# Patient Record
Sex: FEMALE | Race: WHITE | HISPANIC OR LATINO | Employment: FULL TIME
[De-identification: names, ages, dates, MRNs, and addresses within clinical notes are randomized per-mention and may not be internally consistent; named-entity substitution may affect disease eponyms.]

---

## 2017-01-10 ENCOUNTER — CHARTING TRANS (OUTPATIENT)
Dept: OTHER | Age: 32
End: 2017-01-10

## 2017-01-10 ENCOUNTER — LAB SERVICES (OUTPATIENT)
Dept: OTHER | Age: 32
End: 2017-01-10

## 2017-01-11 ENCOUNTER — CHARTING TRANS (OUTPATIENT)
Dept: OTHER | Age: 32
End: 2017-01-11

## 2017-01-11 LAB
C TRACH DNA SPEC QL NAA+PROBE: NEGATIVE
N GONORRHOEA DNA SPEC QL NAA+PROBE: NEGATIVE

## 2017-01-17 ENCOUNTER — CHARTING TRANS (OUTPATIENT)
Dept: OTHER | Age: 32
End: 2017-01-17

## 2017-01-18 ENCOUNTER — CHARTING TRANS (OUTPATIENT)
Dept: OTHER | Age: 32
End: 2017-01-18

## 2017-01-18 ENCOUNTER — CHARTING TRANS (OUTPATIENT)
Dept: PULMONOLOGY | Age: 32
End: 2017-01-18

## 2017-01-20 ENCOUNTER — CHARTING TRANS (OUTPATIENT)
Dept: OTHER | Age: 32
End: 2017-01-20

## 2017-02-07 ENCOUNTER — LAB SERVICES (OUTPATIENT)
Dept: OTHER | Age: 32
End: 2017-02-07

## 2017-02-07 ENCOUNTER — CHARTING TRANS (OUTPATIENT)
Dept: OTHER | Age: 32
End: 2017-02-07

## 2017-02-07 LAB
ALT SERPL W/O P-5'-P-CCNC: 26 U/L (ref 15–43)
AST SERPL-CCNC: 18 U/L (ref 14–43)
CK SERPL-CCNC: 23 U/L (ref 30–135)
PROT SERPL-MCNC: 5.7 G/DL (ref 6.4–8.5)
URATE SERPL-MCNC: 5.2 MG/DL (ref 2.5–6.2)

## 2017-02-08 LAB
DIFFERENTIAL TYPE: ABNORMAL
HEMATOCRIT: 29.4 % (ref 34–45)
HEMOGLOBIN: 9.5 G/DL (ref 11.2–15.7)
MEAN CORPUSCULAR HGB CONCENTRATION: 32.3 % (ref 32–36)
MEAN CORPUSCULAR HGB: 25.1 PG (ref 27–34)
MEAN CORPUSCULAR VOLUME: 77.6 FL (ref 79–95)
MEAN PLATELET VOLUME: 10.9 FL (ref 8.6–12.4)
PLATELET COUNT: 117 10*3/UL (ref 150–400)
RED BLOOD CELL COUNT: 3.79 10*6/UL (ref 3.7–5.2)
RED CELL DISTRIBUTION WIDTH: 15.5 % (ref 11.3–14.8)
WHITE BLOOD CELL COUNT: 3.7 10*3/UL (ref 4–10)

## 2017-02-14 ENCOUNTER — CHARTING TRANS (OUTPATIENT)
Dept: OTHER | Age: 32
End: 2017-02-14

## 2017-02-21 ENCOUNTER — CHARTING TRANS (OUTPATIENT)
Dept: OTHER | Age: 32
End: 2017-02-21

## 2017-02-22 ENCOUNTER — CHARTING TRANS (OUTPATIENT)
Dept: OTHER | Age: 32
End: 2017-02-22

## 2017-02-22 ENCOUNTER — MYAURORA ACCOUNT LINK (OUTPATIENT)
Dept: OTHER | Age: 32
End: 2017-02-22

## 2017-02-22 ENCOUNTER — CHARTING TRANS (OUTPATIENT)
Dept: URGENT CARE | Age: 32
End: 2017-02-22

## 2017-02-22 ASSESSMENT — PAIN SCALES - GENERAL: PAINLEVEL_OUTOF10: 0

## 2017-03-02 ENCOUNTER — CHARTING TRANS (OUTPATIENT)
Dept: OTHER | Age: 32
End: 2017-03-02

## 2017-03-07 ENCOUNTER — CHARTING TRANS (OUTPATIENT)
Dept: OTHER | Age: 32
End: 2017-03-07

## 2017-03-08 ENCOUNTER — CHARTING TRANS (OUTPATIENT)
Dept: OTHER | Age: 32
End: 2017-03-08

## 2017-03-09 ENCOUNTER — IMAGING SERVICES (OUTPATIENT)
Dept: OTHER | Age: 32
End: 2017-03-09

## 2017-03-09 ENCOUNTER — CHARTING TRANS (OUTPATIENT)
Dept: CARDIOLOGY | Age: 32
End: 2017-03-09

## 2017-03-09 ENCOUNTER — CHARTING TRANS (OUTPATIENT)
Dept: OTHER | Age: 32
End: 2017-03-09

## 2017-03-10 ENCOUNTER — CHARTING TRANS (OUTPATIENT)
Dept: OTHER | Age: 32
End: 2017-03-10

## 2017-03-15 ENCOUNTER — CHARTING TRANS (OUTPATIENT)
Dept: OTHER | Age: 32
End: 2017-03-15

## 2017-03-15 ENCOUNTER — IMAGING SERVICES (OUTPATIENT)
Dept: OTHER | Age: 32
End: 2017-03-15

## 2017-03-17 ENCOUNTER — CHARTING TRANS (OUTPATIENT)
Dept: OTHER | Age: 32
End: 2017-03-17

## 2017-03-17 ENCOUNTER — CHARTING TRANS (OUTPATIENT)
Dept: PULMONOLOGY | Age: 32
End: 2017-03-17

## 2017-03-20 ENCOUNTER — CHARTING TRANS (OUTPATIENT)
Dept: OTHER | Age: 32
End: 2017-03-20

## 2017-03-22 ENCOUNTER — LAB SERVICES (OUTPATIENT)
Dept: OTHER | Age: 32
End: 2017-03-22

## 2017-03-22 ENCOUNTER — CHARTING TRANS (OUTPATIENT)
Dept: OTHER | Age: 32
End: 2017-03-22

## 2017-03-22 ENCOUNTER — IMAGING SERVICES (OUTPATIENT)
Dept: OTHER | Age: 32
End: 2017-03-22

## 2017-03-22 LAB
BASOPHIL %: 0 % (ref 0–1.2)
BASOPHIL ABSOLUTE #: 0 10*3/UL (ref 0–0.1)
BUN SERPL-MCNC: 9 MG/DL (ref 7–20)
CALCIUM SERPL-MCNC: 9.1 MG/DL (ref 8.6–10.6)
CHLORIDE SERPL-SCNC: 100 MMOL/L (ref 96–107)
CREATININE, SERUM: 0.5 MG/DL (ref 0.5–1.4)
DIFFERENTIAL TYPE: ABNORMAL
EOSINOPHIL %: 1.2 % (ref 0–10)
EOSINOPHIL ABSOLUTE #: 0.1 10*3/UL (ref 0–0.5)
GFR SERPL CREATININE-BSD FRML MDRD: >60 ML/MIN/{1.73M2}
GFR SERPL CREATININE-BSD FRML MDRD: >60 ML/MIN/{1.73M2}
GLUCOSE SERPL-MCNC: 75 MG/DL (ref 70–200)
HCO3 SERPL-SCNC: 25 MMOL/L (ref 22–32)
HEMATOCRIT: 27.5 % (ref 34–45)
HEMOGLOBIN: 8.7 G/DL (ref 11.2–15.7)
LYMPH PERCENT: 18.1 % (ref 20.5–51.1)
LYMPHOCYTE ABSOLUTE #: 1.1 10*3/UL (ref 1.2–3.4)
MEAN CORPUSCULAR HGB CONCENTRATION: 31.6 % (ref 32–36)
MEAN CORPUSCULAR HGB: 25.4 PG (ref 27–34)
MEAN CORPUSCULAR VOLUME: 80.2 FL (ref 79–95)
MEAN PLATELET VOLUME: 11.1 FL (ref 8.6–12.4)
MONOCYTE ABSOLUTE #: 0.7 10*3/UL (ref 0.2–0.9)
MONOCYTE PERCENT: 11.7 % (ref 4.3–12.9)
NEUTROPHIL ABSOLUTE #: 4.1 10*3/UL (ref 1.4–6.5)
NEUTROPHIL PERCENT: 69 % (ref 34–73.5)
PLATELET COUNT: 148 10*3/UL (ref 150–400)
POTASSIUM SERPL-SCNC: 4.1 MMOL/L (ref 3.5–5.3)
RED BLOOD CELL COUNT: 3.43 10*6/UL (ref 3.7–5.2)
RED CELL DISTRIBUTION WIDTH: 16 % (ref 11.3–14.8)
SODIUM SERPL-SCNC: 138 MMOL/L (ref 136–146)
TSH SERPL DL<=0.05 MIU/L-ACNC: 2.35 M[IU]/L (ref 0.3–4.82)
WHITE BLOOD CELL COUNT: 5.9 10*3/UL (ref 4–10)

## 2017-03-23 ENCOUNTER — CHARTING TRANS (OUTPATIENT)
Dept: OTHER | Age: 32
End: 2017-03-23

## 2017-03-23 LAB
APTT INHIB SENS PPP: 22 SEC (ref 22–30)
APTT-LA IMM 1:2 NP PPP: 22.1 SEC (ref 22–30)
DRVVT IMM NP PPP: 39.1 SEC
LA 3 SCREEN W REFLEX-IMP: ABNORMAL
PATHOLOGIST NAME: ABNORMAL
PROT S ACT/NOR PPP: 70 % (ref 60–110)
SCREEN DRVVT: 40.9 SEC
THROMBIN TIME: 13.7 SEC (ref 15.3–21.1)

## 2017-03-24 LAB
# FETUSES US: ABNORMAL
AFP MOM: ABNORMAL MOM
AFP SERPL-MCNC: 85.5 NG/ML
AGE AT DELIVERY: 29.9 YEARS
B2 GLYCOPROT1 IGA SERPL IA-ACNC: <20 SAU
B2 GLYCOPROT1 IGG SERPL IA-ACNC: <20 SGU
B2 GLYCOPROT1 IGM SERPL IA-ACNC: <20 SMU
CARDIOLIPIN IGA SER IA-ACNC: <20 APL
CARDIOLIPIN IGG SER IA-ACNC: <20 GPL
CARDIOLIPIN IGM SER IA-ACNC: <20 MPL
GA: ABNORMAL WK
IDDM PATIENT QL: NO
NEURAL TUBE DEFECT RISK FETUS: ABNORMAL %
SCREENING STATUS: ABNORMAL
SERVICE CMNT-IMP: ABNORMAL
WEIGHT: ABNORMAL LB

## 2017-03-29 ENCOUNTER — CHARTING TRANS (OUTPATIENT)
Dept: OTHER | Age: 32
End: 2017-03-29

## 2017-04-04 ENCOUNTER — CHARTING TRANS (OUTPATIENT)
Dept: OTHER | Age: 32
End: 2017-04-04

## 2017-04-04 ENCOUNTER — LAB SERVICES (OUTPATIENT)
Dept: OTHER | Age: 32
End: 2017-04-04

## 2017-04-04 LAB
PROT 24H UR-MRATE: 105 MG/24 HR (ref 0–148)
PROT ?TM UR-MCNC: 14 MG/DL

## 2017-04-07 ENCOUNTER — CHARTING TRANS (OUTPATIENT)
Dept: OTHER | Age: 32
End: 2017-04-07

## 2017-04-20 ENCOUNTER — CHARTING TRANS (OUTPATIENT)
Dept: OTHER | Age: 32
End: 2017-04-20

## 2017-04-22 ENCOUNTER — LAB SERVICES (OUTPATIENT)
Dept: OTHER | Age: 32
End: 2017-04-22

## 2017-04-22 LAB
DIFFERENTIAL TYPE: ABNORMAL
GLUCOSE 1H P 50 G GLC PO SERPL-MCNC: 71 MG/DL (ref 70–130)
HEMATOCRIT: 26.8 % (ref 34–45)
HEMOGLOBIN: 8.5 G/DL (ref 11.2–15.7)
MEAN CORPUSCULAR HGB CONCENTRATION: 31.7 % (ref 32–36)
MEAN CORPUSCULAR HGB: 24.9 PG (ref 27–34)
MEAN CORPUSCULAR VOLUME: 78.6 FL (ref 79–95)
MEAN PLATELET VOLUME: 10.7 FL (ref 8.6–12.4)
PLATELET COUNT: 118 10*3/UL (ref 150–400)
RED BLOOD CELL COUNT: 3.41 10*6/UL (ref 3.7–5.2)
RED CELL DISTRIBUTION WIDTH: 14.6 % (ref 11.3–14.8)
WHITE BLOOD CELL COUNT: 3.8 10*3/UL (ref 4–10)

## 2017-04-25 ENCOUNTER — CHARTING TRANS (OUTPATIENT)
Dept: OTHER | Age: 32
End: 2017-04-25

## 2017-04-26 LAB — PLATELET AB SER QL: NORMAL

## 2017-05-01 ENCOUNTER — CHARTING TRANS (OUTPATIENT)
Dept: OTHER | Age: 32
End: 2017-05-01

## 2017-05-03 ENCOUNTER — CHARTING TRANS (OUTPATIENT)
Dept: OTHER | Age: 32
End: 2017-05-03

## 2017-05-09 ENCOUNTER — CHARTING TRANS (OUTPATIENT)
Dept: OTHER | Age: 32
End: 2017-05-09

## 2017-05-10 ENCOUNTER — LAB SERVICES (OUTPATIENT)
Dept: OTHER | Age: 32
End: 2017-05-10

## 2017-05-12 ENCOUNTER — MYAURORA ACCOUNT LINK (OUTPATIENT)
Dept: OTHER | Age: 32
End: 2017-05-12

## 2017-05-12 ENCOUNTER — CHARTING TRANS (OUTPATIENT)
Dept: PULMONOLOGY | Age: 32
End: 2017-05-12

## 2017-05-12 LAB — BILE AC SERPL-SCNC: 1 UMOL/L (ref 0–10)

## 2017-05-15 ENCOUNTER — CHARTING TRANS (OUTPATIENT)
Dept: OTHER | Age: 32
End: 2017-05-15

## 2017-05-16 ENCOUNTER — CHARTING TRANS (OUTPATIENT)
Dept: OTHER | Age: 32
End: 2017-05-16

## 2017-05-18 ENCOUNTER — CHARTING TRANS (OUTPATIENT)
Dept: OTHER | Age: 32
End: 2017-05-18

## 2017-05-19 ENCOUNTER — CHARTING TRANS (OUTPATIENT)
Dept: OTHER | Age: 32
End: 2017-05-19

## 2017-05-23 ENCOUNTER — CHARTING TRANS (OUTPATIENT)
Dept: OTHER | Age: 32
End: 2017-05-23

## 2017-05-23 ENCOUNTER — LAB SERVICES (OUTPATIENT)
Dept: OTHER | Age: 32
End: 2017-05-23

## 2017-05-23 LAB
FERRITIN SERPL-MCNC: 7 NG/ML (ref 6–137)
IRON SATN MFR SERPL: 5 % (ref 9–55)
IRON SERPL-MCNC: 31 UG/DL (ref 37–170)
TIBC SERPL-MCNC: 564 UG/DL (ref 250–450)

## 2017-05-24 ENCOUNTER — CHARTING TRANS (OUTPATIENT)
Dept: OTHER | Age: 32
End: 2017-05-24

## 2017-05-25 ENCOUNTER — CHARTING TRANS (OUTPATIENT)
Dept: OTHER | Age: 32
End: 2017-05-25

## 2017-06-07 ENCOUNTER — CHARTING TRANS (OUTPATIENT)
Dept: OTHER | Age: 32
End: 2017-06-07

## 2017-06-09 ENCOUNTER — LAB SERVICES (OUTPATIENT)
Dept: OTHER | Age: 32
End: 2017-06-09

## 2017-06-09 ENCOUNTER — IMAGING SERVICES (OUTPATIENT)
Dept: OTHER | Age: 32
End: 2017-06-09

## 2017-06-09 ENCOUNTER — CHARTING TRANS (OUTPATIENT)
Dept: OTHER | Age: 32
End: 2017-06-09

## 2017-06-09 LAB — FETAL FIBRONECTIN: NEGATIVE

## 2017-06-13 ENCOUNTER — LAB SERVICES (OUTPATIENT)
Dept: OTHER | Age: 32
End: 2017-06-13

## 2017-06-13 LAB
BASOPHIL %: 0 % (ref 0–1.2)
BASOPHIL ABSOLUTE #: 0 10*3/UL (ref 0–0.1)
DIFFERENTIAL TYPE: ABNORMAL
EOSINOPHIL %: 1.3 % (ref 0–10)
EOSINOPHIL ABSOLUTE #: 0.1 10*3/UL (ref 0–0.5)
HEMATOCRIT: 29.7 % (ref 34–45)
HEMOGLOBIN: 9.2 G/DL (ref 11.2–15.7)
LYMPH PERCENT: 17.3 % (ref 20.5–51.1)
LYMPHOCYTE ABSOLUTE #: 0.8 10*3/UL (ref 1.2–3.4)
MEAN CORPUSCULAR HGB CONCENTRATION: 31 % (ref 32–36)
MEAN CORPUSCULAR HGB: 23.7 PG (ref 27–34)
MEAN CORPUSCULAR VOLUME: 76.3 FL (ref 79–95)
MEAN PLATELET VOLUME: 11 FL (ref 8.6–12.4)
MONOCYTE ABSOLUTE #: 0.8 10*3/UL (ref 0.2–0.9)
MONOCYTE PERCENT: 16.6 % (ref 4.3–12.9)
NEUTROPHIL ABSOLUTE #: 3 10*3/UL (ref 1.4–6.5)
NEUTROPHIL PERCENT: 64.8 % (ref 34–73.5)
PLATELET COUNT: 136 10*3/UL (ref 150–400)
RED BLOOD CELL COUNT: 3.89 10*6/UL (ref 3.7–5.2)
RED CELL DISTRIBUTION WIDTH: 18.3 % (ref 11.3–14.8)
WHITE BLOOD CELL COUNT: 4.6 10*3/UL (ref 4–10)

## 2017-06-16 ENCOUNTER — CHARTING TRANS (OUTPATIENT)
Dept: OTHER | Age: 32
End: 2017-06-16

## 2017-06-16 ENCOUNTER — LAB SERVICES (OUTPATIENT)
Dept: OTHER | Age: 32
End: 2017-06-16

## 2017-06-17 LAB — FINAL REPORT: NORMAL

## 2017-06-19 ENCOUNTER — CHARTING TRANS (OUTPATIENT)
Dept: OTHER | Age: 32
End: 2017-06-19

## 2017-06-21 ENCOUNTER — CHARTING TRANS (OUTPATIENT)
Dept: OTHER | Age: 32
End: 2017-06-21

## 2017-06-23 ENCOUNTER — CHARTING TRANS (OUTPATIENT)
Dept: OBGYN | Age: 32
End: 2017-06-23

## 2017-06-23 ENCOUNTER — IMAGING SERVICES (OUTPATIENT)
Dept: OTHER | Age: 32
End: 2017-06-23

## 2017-06-29 ENCOUNTER — CHARTING TRANS (OUTPATIENT)
Dept: OTHER | Age: 32
End: 2017-06-29

## 2017-06-30 ENCOUNTER — IMAGING SERVICES (OUTPATIENT)
Dept: OTHER | Age: 32
End: 2017-06-30

## 2017-06-30 ENCOUNTER — LAB SERVICES (OUTPATIENT)
Dept: OTHER | Age: 32
End: 2017-06-30

## 2017-06-30 LAB
BASOPHIL %: 0.2 % (ref 0–1.2)
BASOPHIL ABSOLUTE #: 0 10*3/UL (ref 0–0.1)
DIFFERENTIAL TYPE: ABNORMAL
EOSINOPHIL %: 1.2 % (ref 0–10)
EOSINOPHIL ABSOLUTE #: 0.1 10*3/UL (ref 0–0.5)
HEMATOCRIT: 31.1 % (ref 34–45)
HEMOGLOBIN: 10.3 G/DL (ref 11.2–15.7)
LYMPH PERCENT: 18.6 % (ref 20.5–51.1)
LYMPHOCYTE ABSOLUTE #: 1.2 10*3/UL (ref 1.2–3.4)
MEAN CORPUSCULAR HGB CONCENTRATION: 33.1 % (ref 32–36)
MEAN CORPUSCULAR HGB: 25.1 PG (ref 27–34)
MEAN CORPUSCULAR VOLUME: 75.7 FL (ref 79–95)
MEAN PLATELET VOLUME: 11.1 FL (ref 8.6–12.4)
MONOCYTE ABSOLUTE #: 0.9 10*3/UL (ref 0.2–0.9)
MONOCYTE PERCENT: 14.6 % (ref 4.3–12.9)
NEUTROPHIL ABSOLUTE #: 4.2 10*3/UL (ref 1.4–6.5)
NEUTROPHIL PERCENT: 65.4 % (ref 34–73.5)
PLATELET COUNT: 142 10*3/UL (ref 150–400)
RED BLOOD CELL COUNT: 4.11 10*6/UL (ref 3.7–5.2)
RED CELL DISTRIBUTION WIDTH: 19.1 % (ref 11.3–14.8)
WHITE BLOOD CELL COUNT: 6.5 10*3/UL (ref 4–10)

## 2017-07-07 ENCOUNTER — CHARTING TRANS (OUTPATIENT)
Dept: OTHER | Age: 32
End: 2017-07-07

## 2017-07-07 ENCOUNTER — IMAGING SERVICES (OUTPATIENT)
Dept: OTHER | Age: 32
End: 2017-07-07

## 2017-07-10 ENCOUNTER — CHARTING TRANS (OUTPATIENT)
Dept: OTHER | Age: 32
End: 2017-07-10

## 2017-07-13 ENCOUNTER — LAB SERVICES (OUTPATIENT)
Dept: OTHER | Age: 32
End: 2017-07-13

## 2017-07-13 LAB
BASOPHIL MANUAL: 0 %
BASOPHILS: 0 X10
EOSINOPHILS: 0.1 X10
HEMATOCRIT: 33.4 %
HEMOGLOBIN: 11.2 G/DL
LYMPHOCYTES: 0.9 X10
MEAN CORPUSCULAR HGB: 25.4 PG
MEAN CORPUSCULAR HGB: 33.5 G/DL
MEAN CORPUSCULAR VOL: 75.8 FL
MEAN PLATELET VOLUME: 8.7 FL
MONOCYTE ABSOLUTE #: 0.9 X10
MONOCYTES: 16 %
NEUTROPHIL ABSOLUTE: 3.7 X10
PLATELET COUNT: 121 X10
RED BLOOD CELL COUNT: 4.4 X10
RED CELL DISTRIBUTIO: 21.2 %
WHITE BLOOD CELL COU: 5.6 X10

## 2017-07-14 ENCOUNTER — LAB SERVICES (OUTPATIENT)
Dept: OTHER | Age: 32
End: 2017-07-14

## 2017-07-14 ENCOUNTER — CHARTING TRANS (OUTPATIENT)
Dept: OTHER | Age: 32
End: 2017-07-14

## 2017-07-18 ENCOUNTER — CHARTING TRANS (OUTPATIENT)
Dept: OTHER | Age: 32
End: 2017-07-18

## 2017-07-18 LAB — TXT: NORMAL

## 2017-07-25 ENCOUNTER — CHARTING TRANS (OUTPATIENT)
Dept: OTHER | Age: 32
End: 2017-07-25

## 2017-08-01 ENCOUNTER — CHARTING TRANS (OUTPATIENT)
Dept: OTHER | Age: 32
End: 2017-08-01

## 2017-08-25 ENCOUNTER — LAB SERVICES (OUTPATIENT)
Dept: OTHER | Age: 32
End: 2017-08-25

## 2017-08-25 ENCOUNTER — CHARTING TRANS (OUTPATIENT)
Dept: OTHER | Age: 32
End: 2017-08-25

## 2017-08-25 LAB
DIFFERENTIAL TYPE: ABNORMAL
HEMATOCRIT: 34.8 % (ref 34–45)
HEMOGLOBIN: 11.3 G/DL (ref 11.2–15.7)
LYMPH PERCENT: 30.4 % (ref 20.5–51.1)
LYMPHOCYTE ABSOLUTE #: 1.1 10*3/UL (ref 1.2–3.4)
MEAN CORPUSCULAR HGB CONCENTRATION: 32.5 % (ref 32–36)
MEAN CORPUSCULAR HGB: 25.3 PG (ref 27–34)
MEAN CORPUSCULAR VOLUME: 78 FL (ref 79–95)
MEAN PLATELET VOLUME: 9.5 FL (ref 8.6–12.4)
MIXED %: 19.1 % (ref 4.3–12.9)
MIXED ABSOLUTE #: 0.7 10*3/UL (ref 0.2–0.9)
NEUTROPHIL ABSOLUTE #: 1.8 10*3/UL (ref 1.4–6.5)
NEUTROPHIL PERCENT: 50.5 % (ref 34–73.5)
PLATELET COUNT: 121 10*3/UL (ref 150–400)
RED BLOOD CELL COUNT: 4.46 10*6/UL (ref 3.7–5.2)
RED CELL DISTRIBUTION WIDTH: 13.7 % (ref 11.3–14.8)
WHITE BLOOD CELL COUNT: 3.6 10*3/UL (ref 4–10)

## 2017-08-30 ENCOUNTER — CHARTING TRANS (OUTPATIENT)
Dept: OTHER | Age: 32
End: 2017-08-30

## 2017-09-29 ENCOUNTER — CHARTING TRANS (OUTPATIENT)
Dept: HEMATOLOGY/ONCOLOGY | Age: 32
End: 2017-09-29

## 2017-09-29 ENCOUNTER — MYAURORA ACCOUNT LINK (OUTPATIENT)
Dept: OTHER | Age: 32
End: 2017-09-29

## 2017-09-29 ASSESSMENT — PAIN SCALES - GENERAL: PAINLEVEL_OUTOF10: 0

## 2017-10-10 ENCOUNTER — CHARTING TRANS (OUTPATIENT)
Dept: OTHER | Age: 32
End: 2017-10-10

## 2017-11-03 ENCOUNTER — LAB SERVICES (OUTPATIENT)
Dept: OTHER | Age: 32
End: 2017-11-03

## 2017-11-03 ENCOUNTER — IMAGING SERVICES (OUTPATIENT)
Dept: OTHER | Age: 32
End: 2017-11-03

## 2017-11-03 LAB
CREATININE, SERUM: 0.51 MG/DL (ref 0.4–1.4)
GFR AFRICAN AMERICAN: NORMAL ML/MIN/1.73SQ.M.
GFR NON AFRICAN AMER: >60 ML/MIN/1.73SQ.M.

## 2017-11-09 ENCOUNTER — CHARTING TRANS (OUTPATIENT)
Dept: OTHER | Age: 32
End: 2017-11-09

## 2018-01-25 ENCOUNTER — CHARTING TRANS (OUTPATIENT)
Dept: OTHER | Age: 33
End: 2018-01-25

## 2018-01-25 ENCOUNTER — MYAURORA ACCOUNT LINK (OUTPATIENT)
Dept: OTHER | Age: 33
End: 2018-01-25

## 2018-01-25 ASSESSMENT — PAIN SCALES - GENERAL: PAINLEVEL_OUTOF10: 4

## 2018-01-29 ENCOUNTER — CHARTING TRANS (OUTPATIENT)
Dept: OTHER | Age: 33
End: 2018-01-29

## 2018-02-05 ENCOUNTER — CHARTING TRANS (OUTPATIENT)
Dept: OTHER | Age: 33
End: 2018-02-05

## 2018-02-05 ENCOUNTER — IMAGING SERVICES (OUTPATIENT)
Dept: OTHER | Age: 33
End: 2018-02-05

## 2018-02-05 ENCOUNTER — MYAURORA ACCOUNT LINK (OUTPATIENT)
Dept: OTHER | Age: 33
End: 2018-02-05

## 2018-02-15 ENCOUNTER — CHARTING TRANS (OUTPATIENT)
Dept: OTHER | Age: 33
End: 2018-02-15

## 2018-02-15 ENCOUNTER — IMAGING SERVICES (OUTPATIENT)
Dept: OTHER | Age: 33
End: 2018-02-15

## 2018-02-19 ENCOUNTER — CHARTING TRANS (OUTPATIENT)
Dept: OTHER | Age: 33
End: 2018-02-19

## 2018-03-05 ENCOUNTER — CHARTING TRANS (OUTPATIENT)
Dept: OTHER | Age: 33
End: 2018-03-05

## 2018-03-05 ENCOUNTER — MYAURORA ACCOUNT LINK (OUTPATIENT)
Dept: OTHER | Age: 33
End: 2018-03-05

## 2018-03-06 ENCOUNTER — ANCILLARY ORDERS (OUTPATIENT)
Dept: OTHER | Age: 33
End: 2018-03-06

## 2018-03-06 DIAGNOSIS — E07.9 DISORDER OF THYROID: ICD-10-CM

## 2018-03-11 ENCOUNTER — MYAURORA ACCOUNT LINK (OUTPATIENT)
Dept: OTHER | Age: 33
End: 2018-03-11

## 2018-03-11 ENCOUNTER — CHARTING TRANS (OUTPATIENT)
Dept: OTHER | Age: 33
End: 2018-03-11

## 2018-03-14 ENCOUNTER — LAB SERVICES (OUTPATIENT)
Dept: OTHER | Age: 33
End: 2018-03-14

## 2018-03-14 LAB
ALBUMIN SERPL BCG-MCNC: 4.5 G/DL (ref 3.6–5.1)
ALP SERPL-CCNC: 125 U/L (ref 45–105)
ALT SERPL W/O P-5'-P-CCNC: 8 U/L (ref 7–34)
AST SERPL-CCNC: 11 U/L (ref 9–37)
BILIRUB SERPL-MCNC: 0.3 MG/DL (ref 0–1)
BUN SERPL-MCNC: 8 MG/DL (ref 7–20)
CALCIUM SERPL-MCNC: 8.8 MG/DL (ref 8.6–10.6)
CHLORIDE SERPL-SCNC: 103 MMOL/L (ref 96–107)
CREATININE, SERUM: 0.6 MG/DL (ref 0.5–1.4)
DIFFERENTIAL TYPE: ABNORMAL
GFR SERPL CREATININE-BSD FRML MDRD: >60 ML/MIN/{1.73M2}
GFR SERPL CREATININE-BSD FRML MDRD: >60 ML/MIN/{1.73M2}
GLUCOSE SERPL-MCNC: 105 MG/DL (ref 70–200)
HCO3 SERPL-SCNC: 27 MMOL/L (ref 22–32)
HEMATOCRIT: 32.2 % (ref 34–45)
HEMOGLOBIN: 10.1 G/DL (ref 11.2–15.7)
LYMPH PERCENT: 22.4 % (ref 20.5–51.1)
LYMPHOCYTE ABSOLUTE #: 1.2 10*3/UL (ref 1.2–3.4)
MEAN CORPUSCULAR HGB CONCENTRATION: 31.4 % (ref 32–36)
MEAN CORPUSCULAR HGB: 22.8 PG (ref 27–34)
MEAN CORPUSCULAR VOLUME: 72.7 FL (ref 79–95)
MEAN PLATELET VOLUME: 9.5 FL (ref 8.6–12.4)
MIXED %: 10.7 % (ref 4.3–12.9)
MIXED ABSOLUTE #: 0.6 10*3/UL (ref 0.2–0.9)
NEUTROPHIL ABSOLUTE #: 3.6 10*3/UL (ref 1.4–6.5)
NEUTROPHIL PERCENT: 66.9 % (ref 34–73.5)
PLATELET COUNT: 188 10*3/UL (ref 150–400)
POTASSIUM SERPL-SCNC: 3.7 MMOL/L (ref 3.5–5.3)
PROT SERPL-MCNC: 7 G/DL (ref 6.2–8.1)
RED BLOOD CELL COUNT: 4.43 10*6/UL (ref 3.7–5.2)
RED CELL DISTRIBUTION WIDTH: 13.3 % (ref 11.3–14.8)
SODIUM SERPL-SCNC: 140 MMOL/L (ref 136–146)
WHITE BLOOD CELL COUNT: 5.4 10*3/UL (ref 4–10)

## 2018-03-15 LAB
25(OH)D3 SERPL-MCNC: <12.8 NG/ML (ref 30–100)
TSH SERPL DL<=0.05 MIU/L-ACNC: 1.43 M[IU]/L (ref 0.3–4.82)

## 2018-09-10 ENCOUNTER — CHARTING TRANS (OUTPATIENT)
Dept: OTHER | Age: 33
End: 2018-09-10

## 2018-09-10 ENCOUNTER — MYAURORA ACCOUNT LINK (OUTPATIENT)
Dept: OTHER | Age: 33
End: 2018-09-10

## 2018-09-10 ASSESSMENT — PAIN SCALES - GENERAL: PAINLEVEL_OUTOF10: 2

## 2018-11-17 ENCOUNTER — MYAURORA ACCOUNT LINK (OUTPATIENT)
Dept: OTHER | Age: 33
End: 2018-11-17

## 2018-11-17 ENCOUNTER — CHARTING TRANS (OUTPATIENT)
Dept: OTHER | Age: 33
End: 2018-11-17

## 2018-11-23 ENCOUNTER — IMAGING SERVICES (OUTPATIENT)
Dept: OTHER | Age: 33
End: 2018-11-23

## 2018-11-28 VITALS
SYSTOLIC BLOOD PRESSURE: 118 MMHG | DIASTOLIC BLOOD PRESSURE: 50 MMHG | WEIGHT: 135 LBS | DIASTOLIC BLOOD PRESSURE: 66 MMHG | DIASTOLIC BLOOD PRESSURE: 70 MMHG | WEIGHT: 140 LBS | SYSTOLIC BLOOD PRESSURE: 110 MMHG | SYSTOLIC BLOOD PRESSURE: 90 MMHG | WEIGHT: 138 LBS

## 2018-11-28 VITALS
WEIGHT: 135 LBS | BODY MASS INDEX: 22.47 KG/M2 | WEIGHT: 135 LBS | SYSTOLIC BLOOD PRESSURE: 102 MMHG | SYSTOLIC BLOOD PRESSURE: 118 MMHG | DIASTOLIC BLOOD PRESSURE: 60 MMHG | DIASTOLIC BLOOD PRESSURE: 58 MMHG

## 2018-11-28 VITALS
HEIGHT: 65 IN | OXYGEN SATURATION: 98 % | DIASTOLIC BLOOD PRESSURE: 70 MMHG | SYSTOLIC BLOOD PRESSURE: 112 MMHG | RESPIRATION RATE: 18 BRPM | WEIGHT: 125 LBS | TEMPERATURE: 96.6 F | BODY MASS INDEX: 20.83 KG/M2 | HEART RATE: 80 BPM

## 2018-11-28 VITALS
OXYGEN SATURATION: 98 % | HEART RATE: 124 BPM | DIASTOLIC BLOOD PRESSURE: 58 MMHG | TEMPERATURE: 101.7 F | RESPIRATION RATE: 26 BRPM | SYSTOLIC BLOOD PRESSURE: 110 MMHG

## 2018-11-28 VITALS — SYSTOLIC BLOOD PRESSURE: 100 MMHG | BODY MASS INDEX: 22.63 KG/M2 | WEIGHT: 136 LBS | DIASTOLIC BLOOD PRESSURE: 50 MMHG

## 2018-11-28 VITALS
WEIGHT: 121 LBS | HEIGHT: 65 IN | BODY MASS INDEX: 20.16 KG/M2 | DIASTOLIC BLOOD PRESSURE: 60 MMHG | SYSTOLIC BLOOD PRESSURE: 116 MMHG

## 2018-11-28 VITALS
SYSTOLIC BLOOD PRESSURE: 102 MMHG | WEIGHT: 135 LBS | HEIGHT: 65 IN | HEART RATE: 98 BPM | BODY MASS INDEX: 22.49 KG/M2 | DIASTOLIC BLOOD PRESSURE: 56 MMHG

## 2018-11-28 VITALS
SYSTOLIC BLOOD PRESSURE: 98 MMHG | WEIGHT: 128 LBS | HEIGHT: 65 IN | HEART RATE: 97 BPM | OXYGEN SATURATION: 99 % | BODY MASS INDEX: 21.33 KG/M2 | RESPIRATION RATE: 20 BRPM | DIASTOLIC BLOOD PRESSURE: 66 MMHG

## 2018-11-28 VITALS — SYSTOLIC BLOOD PRESSURE: 98 MMHG | DIASTOLIC BLOOD PRESSURE: 58 MMHG | WEIGHT: 134 LBS

## 2018-11-28 VITALS
HEIGHT: 65 IN | WEIGHT: 128 LBS | BODY MASS INDEX: 21.33 KG/M2 | DIASTOLIC BLOOD PRESSURE: 58 MMHG | HEART RATE: 115 BPM | TEMPERATURE: 100.9 F | SYSTOLIC BLOOD PRESSURE: 106 MMHG | OXYGEN SATURATION: 100 %

## 2018-11-28 VITALS — DIASTOLIC BLOOD PRESSURE: 60 MMHG | SYSTOLIC BLOOD PRESSURE: 112 MMHG | WEIGHT: 136 LBS

## 2018-11-29 VITALS
SYSTOLIC BLOOD PRESSURE: 98 MMHG | DIASTOLIC BLOOD PRESSURE: 60 MMHG | HEIGHT: 65 IN | BODY MASS INDEX: 20.49 KG/M2 | WEIGHT: 123 LBS | OXYGEN SATURATION: 97 % | HEART RATE: 65 BPM

## 2018-11-29 VITALS
SYSTOLIC BLOOD PRESSURE: 102 MMHG | DIASTOLIC BLOOD PRESSURE: 50 MMHG | WEIGHT: 126 LBS | BODY MASS INDEX: 20.97 KG/M2 | DIASTOLIC BLOOD PRESSURE: 52 MMHG | WEIGHT: 132 LBS | DIASTOLIC BLOOD PRESSURE: 50 MMHG | SYSTOLIC BLOOD PRESSURE: 110 MMHG | WEIGHT: 130 LBS | BODY MASS INDEX: 21.63 KG/M2 | SYSTOLIC BLOOD PRESSURE: 98 MMHG | BODY MASS INDEX: 21.97 KG/M2

## 2018-11-29 VITALS — SYSTOLIC BLOOD PRESSURE: 110 MMHG | DIASTOLIC BLOOD PRESSURE: 50 MMHG | WEIGHT: 123 LBS

## 2019-01-13 ENCOUNTER — WALK IN (OUTPATIENT)
Dept: URGENT CARE | Age: 34
End: 2019-01-13

## 2019-01-13 DIAGNOSIS — Z23 NEED FOR INFLUENZA VACCINATION: Primary | ICD-10-CM

## 2019-01-13 PROCEDURE — 90686 IIV4 VACC NO PRSV 0.5 ML IM: CPT | Performed by: NURSE PRACTITIONER

## 2019-01-13 PROCEDURE — 90471 IMMUNIZATION ADMIN: CPT | Performed by: NURSE PRACTITIONER

## 2019-03-05 VITALS
TEMPERATURE: 98.5 F | DIASTOLIC BLOOD PRESSURE: 62 MMHG | SYSTOLIC BLOOD PRESSURE: 106 MMHG | OXYGEN SATURATION: 100 % | HEART RATE: 99 BPM | RESPIRATION RATE: 16 BRPM

## 2019-03-05 VITALS
OXYGEN SATURATION: 99 % | HEART RATE: 86 BPM | RESPIRATION RATE: 16 BRPM | TEMPERATURE: 96.6 F | SYSTOLIC BLOOD PRESSURE: 100 MMHG | DIASTOLIC BLOOD PRESSURE: 56 MMHG

## 2019-03-06 VITALS
SYSTOLIC BLOOD PRESSURE: 120 MMHG | DIASTOLIC BLOOD PRESSURE: 60 MMHG | HEIGHT: 64 IN | BODY MASS INDEX: 20.14 KG/M2 | HEART RATE: 110 BPM | TEMPERATURE: 98.2 F | WEIGHT: 118 LBS

## 2019-03-06 VITALS
RESPIRATION RATE: 16 BRPM | SYSTOLIC BLOOD PRESSURE: 120 MMHG | BODY MASS INDEX: 20.16 KG/M2 | HEART RATE: 94 BPM | HEIGHT: 65 IN | OXYGEN SATURATION: 100 % | WEIGHT: 121 LBS | DIASTOLIC BLOOD PRESSURE: 54 MMHG

## 2019-03-06 VITALS
SYSTOLIC BLOOD PRESSURE: 112 MMHG | OXYGEN SATURATION: 97 % | HEART RATE: 90 BPM | DIASTOLIC BLOOD PRESSURE: 64 MMHG | RESPIRATION RATE: 16 BRPM | TEMPERATURE: 97.9 F

## 2019-03-06 VITALS
RESPIRATION RATE: 16 BRPM | SYSTOLIC BLOOD PRESSURE: 114 MMHG | TEMPERATURE: 96.4 F | DIASTOLIC BLOOD PRESSURE: 62 MMHG | OXYGEN SATURATION: 98 % | HEART RATE: 93 BPM

## 2019-08-30 ENCOUNTER — LAB SERVICES (OUTPATIENT)
Dept: LAB | Age: 34
End: 2019-08-30

## 2019-08-30 ENCOUNTER — OFFICE VISIT (OUTPATIENT)
Dept: FAMILY MEDICINE | Age: 34
End: 2019-08-30

## 2019-08-30 VITALS
DIASTOLIC BLOOD PRESSURE: 66 MMHG | HEIGHT: 65 IN | HEART RATE: 96 BPM | RESPIRATION RATE: 18 BRPM | TEMPERATURE: 96 F | WEIGHT: 108 LBS | BODY MASS INDEX: 17.99 KG/M2 | OXYGEN SATURATION: 99 % | SYSTOLIC BLOOD PRESSURE: 112 MMHG

## 2019-08-30 DIAGNOSIS — D61.818 PANCYTOPENIA (CMD): ICD-10-CM

## 2019-08-30 DIAGNOSIS — D64.9 ANEMIA, UNSPECIFIED TYPE: ICD-10-CM

## 2019-08-30 DIAGNOSIS — R09.81 SINUS CONGESTION: ICD-10-CM

## 2019-08-30 DIAGNOSIS — D86.9 SARCOIDOSIS: ICD-10-CM

## 2019-08-30 DIAGNOSIS — R16.1 SPLENOMEGALY: ICD-10-CM

## 2019-08-30 DIAGNOSIS — R53.83 OTHER FATIGUE: ICD-10-CM

## 2019-08-30 DIAGNOSIS — R63.4 UNINTENTIONAL WEIGHT LOSS: ICD-10-CM

## 2019-08-30 DIAGNOSIS — R94.2 RESTRICTIVE PATTERN PRESENT ON PULMONARY FUNCTION TESTING: Primary | ICD-10-CM

## 2019-08-30 DIAGNOSIS — E07.9 ASYMMETRICAL THYROID: ICD-10-CM

## 2019-08-30 DIAGNOSIS — Z01.419 WELL WOMAN EXAM WITH ROUTINE GYNECOLOGICAL EXAM: ICD-10-CM

## 2019-08-30 DIAGNOSIS — D68.59 PROTEIN S DEFICIENCY (CMD): ICD-10-CM

## 2019-08-30 DIAGNOSIS — Z12.4 CERVICAL CANCER SCREENING: ICD-10-CM

## 2019-08-30 DIAGNOSIS — R76.0 LUPUS ANTICOAGULANT POSITIVE: ICD-10-CM

## 2019-08-30 DIAGNOSIS — Z23 NEED FOR PNEUMOCOCCAL VACCINATION: ICD-10-CM

## 2019-08-30 PROBLEM — J45.991 COUGH VARIANT ASTHMA: Status: ACTIVE | Noted: 2017-03-17

## 2019-08-30 PROBLEM — H61.21 IMPACTED CERUMEN OF RIGHT EAR: Status: ACTIVE | Noted: 2018-03-05

## 2019-08-30 PROBLEM — R01.1 HEART MURMUR: Status: ACTIVE | Noted: 2018-02-05

## 2019-08-30 LAB
25(OH)D3 SERPL-MCNC: 15.8 NG/ML (ref 30–100)
ALBUMIN SERPL-MCNC: 4.5 G/DL (ref 3.6–5.1)
ALP SERPL-CCNC: 89 U/L (ref 45–130)
ALT SERPL W/O P-5'-P-CCNC: 18 U/L (ref 4–38)
ANISOCYTOSIS: NORMAL
AST SERPL-CCNC: 30 U/L (ref 14–43)
BASOPHIL %: 0.3 % (ref 0–1.2)
BASOPHIL ABSOLUTE #: 0 10*3/UL (ref 0–0.1)
BILIRUB SERPL-MCNC: 0.6 MG/DL (ref 0–1.3)
BUN SERPL-MCNC: 10 MG/DL (ref 7–20)
CALCIUM SERPL-MCNC: 9.8 MG/DL (ref 8.6–10.6)
CHLORIDE SERPL-SCNC: 103 MMOL/L (ref 96–107)
CO2 SERPL-SCNC: 29 MMOL/L (ref 22–32)
CREAT SERPL-MCNC: 0.7 MG/DL (ref 0.5–1.4)
DIFFERENTIAL TYPE: ABNORMAL
EOSINOPHIL %: 4 % (ref 0–10)
EOSINOPHIL ABSOLUTE #: 0.1 10*3/UL (ref 0–0.5)
FERRITIN SERPL-MCNC: 10 NG/ML (ref 6–137)
GFR SERPL CREATININE-BSD FRML MDRD: >60 ML/MIN/{1.73M2}
GFR SERPL CREATININE-BSD FRML MDRD: >60 ML/MIN/{1.73M2}
GLUCOSE SERPL-MCNC: 85 MG/DL (ref 70–200)
HEMATOCRIT: 35.8 % (ref 34–45)
HEMOGLOBIN: 10.9 G/DL (ref 11.2–15.7)
HYPOCHROMASIA: NORMAL
IRON SATN MFR SERPL: 7 % (ref 9–55)
IRON SERPL-MCNC: 34 UG/DL (ref 37–170)
LYMPH PERCENT: 26.3 % (ref 20.5–51.1)
LYMPHOCYTE ABSOLUTE #: 0.9 10*3/UL (ref 1.2–3.4)
MEAN CORPUSCULAR HGB CONCENTRATION: 30.4 % (ref 32–36)
MEAN CORPUSCULAR HGB: 22.6 PG (ref 27–34)
MEAN CORPUSCULAR VOLUME: 74.3 FL (ref 79–95)
MEAN PLATELET VOLUME: 11.3 FL (ref 8.6–12.4)
MONOCYTE ABSOLUTE #: 0.6 10*3/UL (ref 0.2–0.9)
MONOCYTE PERCENT: 17.4 % (ref 4.3–12.9)
NEUTROPHIL ABSOLUTE #: 1.8 10*3/UL (ref 1.4–6.5)
NEUTROPHIL PERCENT: 52 % (ref 34–73.5)
PLATELET COUNT: 157 10*3/UL (ref 150–400)
POTASSIUM SERPL-SCNC: 4.5 MMOL/L (ref 3.5–5.3)
PROT SERPL-MCNC: 7.1 G/DL (ref 6.4–8.5)
RED BLOOD CELL COUNT: 4.82 10*6/UL (ref 3.7–5.2)
RED CELL DISTRIBUTION WIDTH: 16.3 % (ref 11.3–14.8)
SODIUM SERPL-SCNC: 139 MMOL/L (ref 136–146)
TIBC SERPL-MCNC: 523 UG/DL (ref 250–450)
TSH SERPL DL<=0.05 MIU/L-ACNC: 2.87 M[IU]/L (ref 0.3–4.82)
WHITE BLOOD CELL COUNT: 3.5 10*3/UL (ref 4–10)

## 2019-08-30 PROCEDURE — 83540 ASSAY OF IRON: CPT | Performed by: FAMILY MEDICINE

## 2019-08-30 PROCEDURE — 82728 ASSAY OF FERRITIN: CPT | Performed by: FAMILY MEDICINE

## 2019-08-30 PROCEDURE — 80053 COMPREHEN METABOLIC PANEL: CPT | Performed by: FAMILY MEDICINE

## 2019-08-30 PROCEDURE — 84443 ASSAY THYROID STIM HORMONE: CPT | Performed by: FAMILY MEDICINE

## 2019-08-30 PROCEDURE — 85025 COMPLETE CBC W/AUTO DIFF WBC: CPT | Performed by: FAMILY MEDICINE

## 2019-08-30 PROCEDURE — 36415 COLL VENOUS BLD VENIPUNCTURE: CPT | Performed by: FAMILY MEDICINE

## 2019-08-30 PROCEDURE — 90732 PPSV23 VACC 2 YRS+ SUBQ/IM: CPT

## 2019-08-30 PROCEDURE — 82306 VITAMIN D 25 HYDROXY: CPT | Performed by: FAMILY MEDICINE

## 2019-08-30 PROCEDURE — 90471 IMMUNIZATION ADMIN: CPT

## 2019-08-30 PROCEDURE — 99395 PREV VISIT EST AGE 18-39: CPT | Performed by: FAMILY MEDICINE

## 2019-08-30 PROCEDURE — 88142 CYTOPATH C/V THIN LAYER: CPT | Performed by: PATHOLOGY

## 2019-08-30 PROCEDURE — 99214 OFFICE O/P EST MOD 30 MIN: CPT | Performed by: FAMILY MEDICINE

## 2019-08-30 PROCEDURE — 83550 IRON BINDING TEST: CPT | Performed by: FAMILY MEDICINE

## 2019-08-30 RX ORDER — ALBUTEROL SULFATE 90 UG/1
2 AEROSOL, METERED RESPIRATORY (INHALATION)
COMMUNITY
Start: 2018-03-06 | End: 2019-12-30 | Stop reason: ALTCHOICE

## 2019-08-30 RX ORDER — FLUTICASONE PROPIONATE 50 MCG
2 SPRAY, SUSPENSION (ML) NASAL DAILY
Qty: 3 BOTTLE | Refills: 3 | Status: SHIPPED | OUTPATIENT
Start: 2019-08-30

## 2019-08-30 RX ORDER — FLUTICASONE PROPIONATE 50 MCG
2 SPRAY, SUSPENSION (ML) NASAL
COMMUNITY
Start: 2018-03-06 | End: 2019-12-30 | Stop reason: SDUPTHER

## 2019-08-30 RX ORDER — ERGOCALCIFEROL (VITAMIN D2) 50 MCG
2000 CAPSULE ORAL DAILY
Qty: 30 CAPSULE | Refills: 12 | Status: SHIPPED
Start: 2019-11-30 | End: 2021-03-15 | Stop reason: ALTCHOICE

## 2019-08-30 RX ORDER — ERGOCALCIFEROL 1.25 MG/1
50000 CAPSULE ORAL
Qty: 12 CAPSULE | Refills: 0 | Status: SHIPPED | OUTPATIENT
Start: 2019-09-02 | End: 2019-12-30 | Stop reason: SDUPTHER

## 2019-08-30 SDOH — SOCIAL STABILITY: SOCIAL INSECURITY
WITHIN THE LAST YEAR, HAVE TO BEEN RAPED OR FORCED TO HAVE ANY KIND OF SEXUAL ACTIVITY BY YOUR PARTNER OR EX-PARTNER?: PATIENT DECLINED

## 2019-08-30 SDOH — ECONOMIC STABILITY: FOOD INSECURITY: WITHIN THE PAST 12 MONTHS, YOU WORRIED THAT YOUR FOOD WOULD RUN OUT BEFORE YOU GOT MONEY TO BUY MORE.: PATIENT DECLINED

## 2019-08-30 SDOH — SOCIAL STABILITY: SOCIAL NETWORK: ARE YOU MARRIED, WIDOWED, DIVORCED, SEPARATED, NEVER MARRIED, OR LIVING WITH A PARTNER?: PATIENT DECLINED

## 2019-08-30 SDOH — ECONOMIC STABILITY: FOOD INSECURITY: WITHIN THE PAST 12 MONTHS, THE FOOD YOU BOUGHT JUST DIDN'T LAST AND YOU DIDN'T HAVE MONEY TO GET MORE.: PATIENT DECLINED

## 2019-08-30 SDOH — SOCIAL STABILITY: SOCIAL INSECURITY
WITHIN THE LAST YEAR, HAVE YOU BEEN HUMILIATED OR EMOTIONALLY ABUSED IN OTHER WAYS BY YOUR PARTNER OR EX-PARTNER?: PATIENT DECLINED

## 2019-08-30 SDOH — SOCIAL STABILITY: SOCIAL NETWORK: HOW OFTEN DO YOU ATTENT MEETINGS OF THE CLUB OR ORGANIZATION YOU BELONG TO?: PATIENT DECLINED

## 2019-08-30 SDOH — ECONOMIC STABILITY: TRANSPORTATION INSECURITY
IN THE PAST 12 MONTHS, HAS THE LACK OF TRANSPORTATION KEPT YOU FROM MEDICAL APPOINTMENTS OR FROM GETTING MEDICATIONS?: PATIENT DECLINED

## 2019-08-30 SDOH — HEALTH STABILITY: PHYSICAL HEALTH
ON AVERAGE, HOW MANY DAYS PER WEEK DO YOU ENGAGE IN MODERATE TO STRENUOUS EXERCISE (LIKE A BRISK WALK)?: PATIENT DECLINED

## 2019-08-30 SDOH — ECONOMIC STABILITY: TRANSPORTATION INSECURITY
IN THE PAST 12 MONTHS, HAS LACK OF TRANSPORTATION KEPT YOU FROM MEETINGS, WORK, OR FROM GETTING THINGS NEEDED FOR DAILY LIVING?: PATIENT DECLINED

## 2019-08-30 SDOH — SOCIAL STABILITY: SOCIAL NETWORK: IN A TYPICAL WEEK, HOW MANY TIMES DO YOU TALK ON THE PHONE WITH FAMILY, FRIENDS, OR NEIGHBORS?: PATIENT DECLINED

## 2019-08-30 SDOH — SOCIAL STABILITY: SOCIAL INSECURITY
WITHIN THE LAST YEAR, HAVE YOU BEEN KICKED, HIT, SLAPPED, OR OTHERWISE PHYSICALLY HURT BY YOUR PARTNER OR EX-PARTNER?: PATIENT DECLINED

## 2019-08-30 SDOH — SOCIAL STABILITY: SOCIAL INSECURITY: WITHIN THE LAST YEAR, HAVE YOU BEEN AFRAID OF YOUR PARTNER OR EX-PARTNER?: PATIENT DECLINED

## 2019-08-30 SDOH — SOCIAL STABILITY: SOCIAL NETWORK
DO YOU BELONG TO ANY CLUBS OR ORGANIZATIONS SUCH AS CHURCH GROUPS UNIONS, FRATERNAL OR ATHLETIC GROUPS, OR SCHOOL GROUPS?: PATIENT DECLINED

## 2019-08-30 SDOH — HEALTH STABILITY: MENTAL HEALTH
STRESS IS WHEN SOMEONE FEELS TENSE, NERVOUS, ANXIOUS, OR CAN'T SLEEP AT NIGHT BECAUSE THEIR MIND IS TROUBLED. HOW STRESSED ARE YOU?: TO SOME EXTENT

## 2019-08-30 SDOH — SOCIAL STABILITY: SOCIAL NETWORK: HOW OFTEN DO YOU GET TOGETHER WITH FRIENDS OR RELATIVES?: PATIENT DECLINED

## 2019-08-30 SDOH — ECONOMIC STABILITY: INCOME INSECURITY: HOW HARD IS IT FOR YOU TO PAY FOR THE VERY BASICS LIKE FOOD, HOUSING, MEDICAL CARE, AND HEATING?: NOT HARD AT ALL

## 2019-08-30 SDOH — HEALTH STABILITY: PHYSICAL HEALTH: ON AVERAGE, HOW MANY MINUTES DO YOU ENGAGE IN EXERCISE AT THIS LEVEL?: PATIENT DECLINED

## 2019-08-30 SDOH — SOCIAL STABILITY: SOCIAL NETWORK: HOW OFTEN DO YOU ATTEND CHURCH OR RELIGIOUS SERVICES?: PATIENT DECLINED

## 2019-08-30 ASSESSMENT — PATIENT HEALTH QUESTIONNAIRE - PHQ9
1. LITTLE INTEREST OR PLEASURE IN DOING THINGS: NOT AT ALL
SUM OF ALL RESPONSES TO PHQ9 QUESTIONS 1 AND 2: 0
SUM OF ALL RESPONSES TO PHQ9 QUESTIONS 1 AND 2: 0
2. FEELING DOWN, DEPRESSED OR HOPELESS: NOT AT ALL

## 2019-08-30 ASSESSMENT — ENCOUNTER SYMPTOMS
CHEST TIGHTNESS: 0
RHINORRHEA: 1
SLEEP DISTURBANCE: 1
FATIGUE: 1
SHORTNESS OF BREATH: 1

## 2019-09-06 LAB — AP REPORT: NORMAL

## 2019-09-10 LAB — HPV I/H RISK 4 DNA CVX QL NAA+PROBE: NORMAL

## 2019-11-09 ENCOUNTER — WALK IN (OUTPATIENT)
Dept: URGENT CARE | Age: 34
End: 2019-11-09

## 2019-11-09 VITALS
DIASTOLIC BLOOD PRESSURE: 60 MMHG | TEMPERATURE: 98.2 F | HEART RATE: 100 BPM | RESPIRATION RATE: 20 BRPM | SYSTOLIC BLOOD PRESSURE: 110 MMHG | OXYGEN SATURATION: 98 %

## 2019-11-09 DIAGNOSIS — J02.9 SORE THROAT: Primary | ICD-10-CM

## 2019-11-09 DIAGNOSIS — J06.9 URI, ACUTE: ICD-10-CM

## 2019-11-09 LAB
INTERNAL PROCEDURAL CONTROLS ACCEPTABLE: YES
S PYO AG THROAT QL IA.RAPID: NEGATIVE

## 2019-11-09 PROCEDURE — 99214 OFFICE O/P EST MOD 30 MIN: CPT | Performed by: PEDIATRICS

## 2019-11-09 PROCEDURE — 87880 STREP A ASSAY W/OPTIC: CPT | Performed by: PEDIATRICS

## 2019-11-09 RX ORDER — BENZONATATE 200 MG/1
200 CAPSULE ORAL 3 TIMES DAILY PRN
Qty: 20 CAPSULE | Refills: 0 | Status: SHIPPED | OUTPATIENT
Start: 2019-11-09 | End: 2019-12-30 | Stop reason: ALTCHOICE

## 2019-12-27 ENCOUNTER — TELEPHONE (OUTPATIENT)
Dept: PULMONOLOGY | Age: 34
End: 2019-12-27

## 2019-12-30 ENCOUNTER — WALK IN (OUTPATIENT)
Dept: URGENT CARE | Age: 34
End: 2019-12-30

## 2019-12-30 VITALS
HEART RATE: 90 BPM | RESPIRATION RATE: 16 BRPM | TEMPERATURE: 100.3 F | DIASTOLIC BLOOD PRESSURE: 68 MMHG | SYSTOLIC BLOOD PRESSURE: 112 MMHG | OXYGEN SATURATION: 99 %

## 2019-12-30 DIAGNOSIS — J45.901 ASTHMA WITH ACUTE EXACERBATION, UNSPECIFIED ASTHMA SEVERITY, UNSPECIFIED WHETHER PERSISTENT: ICD-10-CM

## 2019-12-30 DIAGNOSIS — J06.9 URI, ACUTE: Primary | ICD-10-CM

## 2019-12-30 DIAGNOSIS — H61.23 IMPACTED CERUMEN OF BOTH EARS: ICD-10-CM

## 2019-12-30 PROCEDURE — 69209 REMOVE IMPACTED EAR WAX UNI: CPT | Performed by: FAMILY MEDICINE

## 2019-12-30 PROCEDURE — 99214 OFFICE O/P EST MOD 30 MIN: CPT | Performed by: FAMILY MEDICINE

## 2019-12-30 RX ORDER — CODEINE PHOSPHATE AND GUAIFENESIN 10; 100 MG/5ML; MG/5ML
5 SOLUTION ORAL 4 TIMES DAILY PRN
Qty: 180 ML | Refills: 0 | Status: SHIPPED | OUTPATIENT
Start: 2019-12-30 | End: 2020-01-09

## 2019-12-30 RX ORDER — ALBUTEROL SULFATE 90 UG/1
AEROSOL, METERED RESPIRATORY (INHALATION)
Qty: 1 INHALER | Refills: 0 | Status: SHIPPED | OUTPATIENT
Start: 2019-12-30 | End: 2021-03-15 | Stop reason: SDUPTHER

## 2019-12-30 RX ORDER — PREDNISONE 20 MG/1
40 TABLET ORAL DAILY
Qty: 10 TABLET | Refills: 0 | Status: SHIPPED | OUTPATIENT
Start: 2019-12-30 | End: 2020-01-04

## 2020-01-03 ENCOUNTER — TELEPHONE (OUTPATIENT)
Dept: HEMATOLOGY/ONCOLOGY | Age: 35
End: 2020-01-03

## 2020-01-03 DIAGNOSIS — D61.818 PANCYTOPENIA (CMD): Primary | ICD-10-CM

## 2020-01-06 ENCOUNTER — OFFICE VISIT (OUTPATIENT)
Dept: PULMONOLOGY | Age: 35
End: 2020-01-06

## 2020-01-06 ENCOUNTER — IMAGING SERVICES (OUTPATIENT)
Dept: GENERAL RADIOLOGY | Age: 35
End: 2020-01-06
Attending: INTERNAL MEDICINE

## 2020-01-06 VITALS
SYSTOLIC BLOOD PRESSURE: 100 MMHG | OXYGEN SATURATION: 100 % | HEART RATE: 96 BPM | BODY MASS INDEX: 16.43 KG/M2 | TEMPERATURE: 98.9 F | HEIGHT: 65 IN | WEIGHT: 98.6 LBS | DIASTOLIC BLOOD PRESSURE: 60 MMHG

## 2020-01-06 DIAGNOSIS — J45.40 MODERATE PERSISTENT ASTHMA WITHOUT COMPLICATION: ICD-10-CM

## 2020-01-06 DIAGNOSIS — R05.9 COUGH: ICD-10-CM

## 2020-01-06 DIAGNOSIS — D86.9 SARCOIDOSIS: ICD-10-CM

## 2020-01-06 DIAGNOSIS — J45.991 COUGH VARIANT ASTHMA: Primary | ICD-10-CM

## 2020-01-06 DIAGNOSIS — R05.9 COUGH: Primary | ICD-10-CM

## 2020-01-06 PROCEDURE — 71046 X-RAY EXAM CHEST 2 VIEWS: CPT | Performed by: RADIOLOGY

## 2020-01-06 PROCEDURE — 99214 OFFICE O/P EST MOD 30 MIN: CPT | Performed by: INTERNAL MEDICINE

## 2020-01-06 RX ORDER — PREDNISONE 10 MG/1
TABLET ORAL
Qty: 30 TABLET | Refills: 0 | Status: SHIPPED | OUTPATIENT
Start: 2020-01-06 | End: 2020-04-08 | Stop reason: ALTCHOICE

## 2020-01-06 RX ORDER — LEVOFLOXACIN 500 MG/1
500 TABLET, FILM COATED ORAL DAILY
Qty: 10 TABLET | Refills: 0 | Status: SHIPPED | OUTPATIENT
Start: 2020-01-06 | End: 2020-01-16

## 2020-01-06 RX ORDER — FLUTICASONE PROPIONATE 220 UG/1
2 AEROSOL, METERED RESPIRATORY (INHALATION) 2 TIMES DAILY
Qty: 12 G | Refills: 6 | Status: SHIPPED | OUTPATIENT
Start: 2020-01-06 | End: 2021-03-15 | Stop reason: ALTCHOICE

## 2020-01-10 ENCOUNTER — TELEPHONE (OUTPATIENT)
Dept: PULMONOLOGY | Age: 35
End: 2020-01-10

## 2020-01-13 ENCOUNTER — E-ADVICE (OUTPATIENT)
Dept: PULMONOLOGY | Age: 35
End: 2020-01-13

## 2020-01-13 DIAGNOSIS — J45.991 COUGH VARIANT ASTHMA: Primary | ICD-10-CM

## 2020-01-14 ENCOUNTER — LAB SERVICES (OUTPATIENT)
Dept: LAB | Age: 35
End: 2020-01-14

## 2020-01-14 DIAGNOSIS — J45.991 COUGH VARIANT ASTHMA: ICD-10-CM

## 2020-01-14 DIAGNOSIS — D86.9 SARCOIDOSIS: ICD-10-CM

## 2020-01-14 LAB
BASOPHIL %: 0.1 % (ref 0–1.2)
BASOPHIL ABSOLUTE #: 0 10*3/UL (ref 0–0.1)
DIFFERENTIAL TYPE: ABNORMAL
EOSINOPHIL %: 1.2 % (ref 0–10)
EOSINOPHIL ABSOLUTE #: 0.1 10*3/UL (ref 0–0.5)
HEMATOCRIT: 32.6 % (ref 34–45)
HEMOGLOBIN: 10.1 G/DL (ref 11.2–15.7)
LYMPH PERCENT: 13.9 % (ref 20.5–51.1)
LYMPHOCYTE ABSOLUTE #: 1 10*3/UL (ref 1.2–3.4)
MEAN CORPUSCULAR HGB CONCENTRATION: 31 % (ref 32–36)
MEAN CORPUSCULAR HGB: 22.3 PG (ref 27–34)
MEAN CORPUSCULAR VOLUME: 72 FL (ref 79–95)
MEAN PLATELET VOLUME: 11.5 FL (ref 8.6–12.4)
MONOCYTE ABSOLUTE #: 0.9 10*3/UL (ref 0.2–0.9)
MONOCYTE PERCENT: 11.6 % (ref 4.3–12.9)
NEUTROPHIL ABSOLUTE #: 5.5 10*3/UL (ref 1.4–6.5)
NEUTROPHIL PERCENT: 73.2 % (ref 34–73.5)
PLATELET COUNT: 221 10*3/UL (ref 150–400)
RED BLOOD CELL COUNT: 4.53 10*6/UL (ref 3.7–5.2)
RED CELL DISTRIBUTION WIDTH: 17.2 % (ref 11.3–14.8)
SEDIMENTATION RATE, RBC: 3 MM/H (ref 0–20)
WHITE BLOOD CELL COUNT: 7.5 10*3/UL (ref 4–10)

## 2020-01-14 PROCEDURE — 82164 ANGIOTENSIN I ENZYME TEST: CPT | Performed by: INTERNAL MEDICINE

## 2020-01-14 PROCEDURE — 85025 COMPLETE CBC W/AUTO DIFF WBC: CPT | Performed by: INTERNAL MEDICINE

## 2020-01-14 PROCEDURE — 85652 RBC SED RATE AUTOMATED: CPT | Performed by: INTERNAL MEDICINE

## 2020-01-14 PROCEDURE — 36415 COLL VENOUS BLD VENIPUNCTURE: CPT | Performed by: INTERNAL MEDICINE

## 2020-01-17 LAB — ACE SERPL-CCNC: 32 U/L (ref 9–67)

## 2020-01-20 ENCOUNTER — APPOINTMENT (OUTPATIENT)
Dept: PULMONOLOGY | Age: 35
End: 2020-01-20

## 2020-01-29 ENCOUNTER — OFFICE VISIT (OUTPATIENT)
Dept: PULMONOLOGY | Age: 35
End: 2020-01-29

## 2020-01-29 ENCOUNTER — IMAGING SERVICES (OUTPATIENT)
Dept: GENERAL RADIOLOGY | Age: 35
End: 2020-01-29
Attending: INTERNAL MEDICINE

## 2020-01-29 VITALS
OXYGEN SATURATION: 100 % | BODY MASS INDEX: 17.66 KG/M2 | DIASTOLIC BLOOD PRESSURE: 62 MMHG | HEART RATE: 74 BPM | SYSTOLIC BLOOD PRESSURE: 104 MMHG | WEIGHT: 106 LBS | HEIGHT: 65 IN

## 2020-01-29 DIAGNOSIS — J18.9 PNEUMONIA, UNSPECIFIED ORGANISM: ICD-10-CM

## 2020-01-29 DIAGNOSIS — R53.83 OTHER FATIGUE: ICD-10-CM

## 2020-01-29 DIAGNOSIS — J45.991 COUGH VARIANT ASTHMA: Primary | ICD-10-CM

## 2020-01-29 DIAGNOSIS — J45.991 COUGH VARIANT ASTHMA: ICD-10-CM

## 2020-01-29 PROCEDURE — 99214 OFFICE O/P EST MOD 30 MIN: CPT | Performed by: INTERNAL MEDICINE

## 2020-01-29 PROCEDURE — 71046 X-RAY EXAM CHEST 2 VIEWS: CPT | Performed by: RADIOLOGY

## 2020-01-29 RX ORDER — ERGOCALCIFEROL 1.25 MG/1
CAPSULE ORAL
Qty: 12 CAPSULE | Refills: 0 | OUTPATIENT
Start: 2020-01-29

## 2020-02-07 ENCOUNTER — TELEPHONE (OUTPATIENT)
Dept: PULMONOLOGY | Age: 35
End: 2020-02-07

## 2020-02-07 RX ORDER — PREDNISONE 20 MG/1
40 TABLET ORAL DAILY
Qty: 10 TABLET | Refills: 0 | Status: SHIPPED | OUTPATIENT
Start: 2020-02-07 | End: 2020-02-12

## 2020-02-13 ENCOUNTER — TELEPHONE (OUTPATIENT)
Dept: PEDIATRICS | Age: 35
End: 2020-02-13

## 2020-02-27 ENCOUNTER — LAB SERVICES (OUTPATIENT)
Dept: LAB | Age: 35
End: 2020-02-27

## 2020-02-27 ENCOUNTER — OFFICE VISIT (OUTPATIENT)
Dept: OBGYN | Age: 35
End: 2020-02-27

## 2020-02-27 VITALS
TEMPERATURE: 98.1 F | HEIGHT: 64 IN | HEART RATE: 91 BPM | SYSTOLIC BLOOD PRESSURE: 108 MMHG | BODY MASS INDEX: 18.61 KG/M2 | DIASTOLIC BLOOD PRESSURE: 60 MMHG | WEIGHT: 109 LBS

## 2020-02-27 DIAGNOSIS — Z11.8 SCREENING FOR CHLAMYDIAL DISEASE: ICD-10-CM

## 2020-02-27 DIAGNOSIS — R53.83 OTHER FATIGUE: ICD-10-CM

## 2020-02-27 DIAGNOSIS — N91.2 AMENORRHEA: ICD-10-CM

## 2020-02-27 DIAGNOSIS — N91.2 AMENORRHEA: Primary | ICD-10-CM

## 2020-02-27 DIAGNOSIS — Z23 NEED FOR IMMUNIZATION AGAINST INFLUENZA: ICD-10-CM

## 2020-02-27 DIAGNOSIS — Z11.3 SCREENING FOR GONORRHEA: ICD-10-CM

## 2020-02-27 LAB
25(OH)D3 SERPL-MCNC: 23.1 NG/ML (ref 30–100)
B-HCG SERPL-ACNC: ABNORMAL M[IU]/ML (ref 0–6)
BASOPHIL %: 0.2 % (ref 0–1.2)
BASOPHIL ABSOLUTE #: 0 10*3/UL (ref 0–0.1)
BILIRUBIN URINE: NEGATIVE
BLOOD URINE: NEGATIVE
CLARITY: NORMAL
COLOR: YELLOW
DIFFERENTIAL TYPE: ABNORMAL
EOSINOPHIL %: 1.6 % (ref 0–10)
EOSINOPHIL ABSOLUTE #: 0.1 10*3/UL (ref 0–0.5)
GLUCOSE QUALITATIVE U: NEGATIVE
HBV SURFACE AG SERPL QL IA: NEGATIVE
HEMATOCRIT: 33.7 % (ref 34–45)
HEMOGLOBIN: 11 G/DL (ref 11.2–15.7)
HEPATITIS C ANTIBODY: NEGATIVE
HIV1+2 AB SERPL QL IA: NEGATIVE
KETONES, URINE: NEGATIVE
LEUKOCYTE ESTERASE URINE: NEGATIVE
LYMPH PERCENT: 15.6 % (ref 20.5–51.1)
LYMPHOCYTE ABSOLUTE #: 0.8 10*3/UL (ref 1.2–3.4)
MEAN CORPUSCULAR HGB CONCENTRATION: 32.6 % (ref 32–36)
MEAN CORPUSCULAR HGB: 24.3 PG (ref 27–34)
MEAN CORPUSCULAR VOLUME: 74.4 FL (ref 79–95)
MEAN PLATELET VOLUME: 11.8 FL (ref 8.6–12.4)
MONOCYTE ABSOLUTE #: 0.8 10*3/UL (ref 0.2–0.9)
MONOCYTE PERCENT: 15.6 % (ref 4.3–12.9)
MUCOUS: NORMAL
NEUTROPHIL ABSOLUTE #: 3.3 10*3/UL (ref 1.4–6.5)
NEUTROPHIL PERCENT: 67 % (ref 34–73.5)
NITRITE URINE: NEGATIVE
PH URINE: 6 (ref 5–7)
PLATELET COUNT: 161 10*3/UL (ref 150–400)
RED BLOOD CELL COUNT: 4.53 10*6/UL (ref 3.7–5.2)
RED BLOOD CELLS URINE: NORMAL (ref 0–3)
RED CELL DISTRIBUTION WIDTH: 16.9 % (ref 11.3–14.8)
RUBV IGG SERPL QL IA: NORMAL [IU]/ML
SPECIFIC GRAVITY URINE: 1.02 (ref 1–1.03)
SQUAMOUS EPITHELIAL CELLS: NORMAL
URINE PROTEIN, QUAL (DIPSTICK): NEGATIVE
UROBILINOGEN URINE: <2
WHITE BLOOD CELL COUNT: 4.9 10*3/UL (ref 4–10)
WHITE BLOOD CELLS URINE: NORMAL (ref 0–5)

## 2020-02-27 PROCEDURE — 36415 COLL VENOUS BLD VENIPUNCTURE: CPT | Performed by: NURSE PRACTITIONER

## 2020-02-27 PROCEDURE — 86803 HEPATITIS C AB TEST: CPT | Performed by: NURSE PRACTITIONER

## 2020-02-27 PROCEDURE — 87491 CHLMYD TRACH DNA AMP PROBE: CPT | Performed by: NURSE PRACTITIONER

## 2020-02-27 PROCEDURE — 85025 COMPLETE CBC W/AUTO DIFF WBC: CPT | Performed by: NURSE PRACTITIONER

## 2020-02-27 PROCEDURE — 87661 TRICHOMONAS VAGINALIS AMPLIF: CPT | Performed by: NURSE PRACTITIONER

## 2020-02-27 PROCEDURE — 90686 IIV4 VACC NO PRSV 0.5 ML IM: CPT

## 2020-02-27 PROCEDURE — 87591 N.GONORRHOEAE DNA AMP PROB: CPT | Performed by: NURSE PRACTITIONER

## 2020-02-27 PROCEDURE — 86762 RUBELLA ANTIBODY: CPT | Performed by: NURSE PRACTITIONER

## 2020-02-27 PROCEDURE — 90471 IMMUNIZATION ADMIN: CPT

## 2020-02-27 PROCEDURE — 84702 CHORIONIC GONADOTROPIN TEST: CPT | Performed by: NURSE PRACTITIONER

## 2020-02-27 PROCEDURE — 82306 VITAMIN D 25 HYDROXY: CPT | Performed by: NURSE PRACTITIONER

## 2020-02-27 PROCEDURE — 86850 RBC ANTIBODY SCREEN: CPT | Performed by: NURSE PRACTITIONER

## 2020-02-27 PROCEDURE — 86592 SYPHILIS TEST NON-TREP QUAL: CPT | Performed by: NURSE PRACTITIONER

## 2020-02-27 PROCEDURE — 99214 OFFICE O/P EST MOD 30 MIN: CPT | Performed by: NURSE PRACTITIONER

## 2020-02-27 PROCEDURE — 86703 HIV-1/HIV-2 1 RESULT ANTBDY: CPT | Performed by: NURSE PRACTITIONER

## 2020-02-27 PROCEDURE — 81003 URINALYSIS AUTO W/O SCOPE: CPT | Performed by: NURSE PRACTITIONER

## 2020-02-27 PROCEDURE — 87340 HEPATITIS B SURFACE AG IA: CPT | Performed by: NURSE PRACTITIONER

## 2020-02-27 ASSESSMENT — PATIENT HEALTH QUESTIONNAIRE - PHQ9
SUM OF ALL RESPONSES TO PHQ9 QUESTIONS 1 AND 2: 0
2. FEELING DOWN, DEPRESSED OR HOPELESS: NOT AT ALL
1. LITTLE INTEREST OR PLEASURE IN DOING THINGS: NOT AT ALL
SUM OF ALL RESPONSES TO PHQ9 QUESTIONS 1 AND 2: 0

## 2020-02-28 ENCOUNTER — TELEPHONE (OUTPATIENT)
Dept: OBGYN | Age: 35
End: 2020-02-28

## 2020-02-28 LAB
BLD GP AB SCN SERPL QL: NEGATIVE
C TRACH DNA GENITAL QL NAA+PROBE: NEGATIVE
N GONORRHOEA DNA GENITAL QL NAA+PROBE: NEGATIVE
TRICHOMONAS VAGINALIS (TV PCR): NEGATIVE

## 2020-03-01 LAB — RPR SER QL: NORMAL

## 2020-03-04 ENCOUNTER — IMAGING SERVICES (OUTPATIENT)
Dept: ULTRASOUND IMAGING | Age: 35
End: 2020-03-04
Attending: NURSE PRACTITIONER

## 2020-03-04 ENCOUNTER — TELEPHONE (OUTPATIENT)
Dept: PULMONOLOGY | Age: 35
End: 2020-03-04

## 2020-03-04 ENCOUNTER — LAB SERVICES (OUTPATIENT)
Dept: LAB | Age: 35
End: 2020-03-04

## 2020-03-04 ENCOUNTER — FIRST OB VISIT (OUTPATIENT)
Dept: OBGYN | Age: 35
End: 2020-03-04

## 2020-03-04 ENCOUNTER — OFFICE VISIT (OUTPATIENT)
Dept: PULMONOLOGY | Age: 35
End: 2020-03-04

## 2020-03-04 VITALS
TEMPERATURE: 98.7 F | HEART RATE: 119 BPM | WEIGHT: 107 LBS | SYSTOLIC BLOOD PRESSURE: 100 MMHG | DIASTOLIC BLOOD PRESSURE: 60 MMHG | BODY MASS INDEX: 17.81 KG/M2 | OXYGEN SATURATION: 95 %

## 2020-03-04 VITALS
TEMPERATURE: 99.2 F | BODY MASS INDEX: 17.83 KG/M2 | SYSTOLIC BLOOD PRESSURE: 110 MMHG | WEIGHT: 107 LBS | DIASTOLIC BLOOD PRESSURE: 60 MMHG | HEIGHT: 65 IN | HEART RATE: 111 BPM

## 2020-03-04 DIAGNOSIS — O09.629 SUPERVISION OF HIGH-RISK PREGNANCY OF YOUNG MULTIGRAVIDA: ICD-10-CM

## 2020-03-04 DIAGNOSIS — R52 GENERALIZED BODY ACHES: Primary | ICD-10-CM

## 2020-03-04 DIAGNOSIS — N91.2 AMENORRHEA: Primary | ICD-10-CM

## 2020-03-04 DIAGNOSIS — J45.991 COUGH VARIANT ASTHMA: Primary | ICD-10-CM

## 2020-03-04 DIAGNOSIS — R52 GENERALIZED BODY ACHES: ICD-10-CM

## 2020-03-04 DIAGNOSIS — O30.041 DICHORIONIC DIAMNIOTIC TWIN PREGNANCY IN FIRST TRIMESTER: Primary | ICD-10-CM

## 2020-03-04 DIAGNOSIS — N91.2 AMENORRHEA: ICD-10-CM

## 2020-03-04 LAB
INFLUENZA TYPE A ANTIGEN: NEGATIVE
INFLUENZA TYPE B ANTIGEN: NEGATIVE

## 2020-03-04 PROCEDURE — 87804 INFLUENZA ASSAY W/OPTIC: CPT | Performed by: OBSTETRICS & GYNECOLOGY

## 2020-03-04 PROCEDURE — 0500F INITIAL PRENATAL CARE VISIT: CPT | Performed by: OBSTETRICS & GYNECOLOGY

## 2020-03-04 PROCEDURE — 76814 OB US NUCHAL MEAS ADD-ON: CPT | Performed by: RADIOLOGY

## 2020-03-04 PROCEDURE — 76813 OB US NUCHAL MEAS 1 GEST: CPT | Performed by: RADIOLOGY

## 2020-03-04 PROCEDURE — 99213 OFFICE O/P EST LOW 20 MIN: CPT | Performed by: INTERNAL MEDICINE

## 2020-03-04 RX ORDER — PREDNISONE 10 MG/1
TABLET ORAL
Qty: 30 TABLET | Refills: 0 | Status: SHIPPED | OUTPATIENT
Start: 2020-03-04 | End: 2020-04-08 | Stop reason: ALTCHOICE

## 2020-03-05 PROBLEM — Z98.891 HISTORY OF LOW TRANSVERSE CESAREAN SECTION: Status: ACTIVE | Noted: 2020-03-05

## 2020-03-05 PROBLEM — Z67.40 TYPE O BLOOD, RH POSITIVE: Status: ACTIVE | Noted: 2020-03-05

## 2020-03-05 PROBLEM — O09.299 HX OF PRE-ECLAMPSIA IN PRIOR PREGNANCY, CURRENTLY PREGNANT: Status: ACTIVE | Noted: 2020-03-05

## 2020-03-05 PROBLEM — O30.049 DICHORIONIC DIAMNIOTIC TWIN PREGNANCY, ANTEPARTUM: Status: ACTIVE | Noted: 2020-03-05

## 2020-03-05 PROBLEM — Z86.2 HISTORY OF ANEMIA: Status: ACTIVE | Noted: 2020-03-05

## 2020-03-09 ENCOUNTER — APPOINTMENT (OUTPATIENT)
Dept: OBGYN | Age: 35
End: 2020-03-09

## 2020-03-09 ENCOUNTER — TELEPHONE (OUTPATIENT)
Dept: PULMONOLOGY | Age: 35
End: 2020-03-09

## 2020-03-09 ENCOUNTER — APPOINTMENT (OUTPATIENT)
Dept: ULTRASOUND IMAGING | Age: 35
End: 2020-03-09
Attending: NURSE PRACTITIONER

## 2020-03-09 RX ORDER — PREDNISONE 20 MG/1
TABLET ORAL
Qty: 14 TABLET | Refills: 0 | Status: SHIPPED | OUTPATIENT
Start: 2020-03-09 | End: 2020-04-08 | Stop reason: ALTCHOICE

## 2020-03-09 RX ORDER — FLUTICASONE PROPIONATE AND SALMETEROL 500; 50 UG/1; UG/1
1 POWDER RESPIRATORY (INHALATION)
Qty: 1 EACH | Refills: 6 | Status: SHIPPED | OUTPATIENT
Start: 2020-03-09 | End: 2021-03-15 | Stop reason: SDUPTHER

## 2020-03-11 ENCOUNTER — WALK IN (OUTPATIENT)
Dept: URGENT CARE | Age: 35
End: 2020-03-11

## 2020-03-11 VITALS
HEART RATE: 116 BPM | TEMPERATURE: 99.1 F | DIASTOLIC BLOOD PRESSURE: 50 MMHG | RESPIRATION RATE: 28 BRPM | SYSTOLIC BLOOD PRESSURE: 104 MMHG | OXYGEN SATURATION: 96 %

## 2020-03-11 DIAGNOSIS — R06.02 SOB (SHORTNESS OF BREATH): ICD-10-CM

## 2020-03-11 DIAGNOSIS — J02.9 SORE THROAT: ICD-10-CM

## 2020-03-11 DIAGNOSIS — J45.901 ASTHMA WITH ACUTE EXACERBATION, UNSPECIFIED ASTHMA SEVERITY, UNSPECIFIED WHETHER PERSISTENT: Primary | ICD-10-CM

## 2020-03-11 LAB
INTERNAL PROCEDURAL CONTROLS ACCEPTABLE: YES
S PYO AG THROAT QL IA.RAPID: NEGATIVE

## 2020-03-11 PROCEDURE — 94640 AIRWAY INHALATION TREATMENT: CPT | Performed by: FAMILY MEDICINE

## 2020-03-11 PROCEDURE — 87880 STREP A ASSAY W/OPTIC: CPT | Performed by: FAMILY MEDICINE

## 2020-03-11 PROCEDURE — 99214 OFFICE O/P EST MOD 30 MIN: CPT | Performed by: FAMILY MEDICINE

## 2020-03-11 RX ORDER — ALBUTEROL SULFATE 2.5 MG/3ML
SOLUTION RESPIRATORY (INHALATION)
Qty: 375 ML | Refills: 0 | Status: SHIPPED | OUTPATIENT
Start: 2020-03-11 | End: 2020-06-12

## 2020-03-11 RX ADMIN — Medication 2.5 MG: at 07:40

## 2020-03-12 ENCOUNTER — E-ADVICE (OUTPATIENT)
Dept: OBGYN | Age: 35
End: 2020-03-12

## 2020-03-16 ENCOUNTER — TELEPHONE (OUTPATIENT)
Dept: OBGYN | Age: 35
End: 2020-03-16

## 2020-03-16 ENCOUNTER — TELEPHONE (OUTPATIENT)
Dept: PULMONOLOGY | Age: 35
End: 2020-03-16

## 2020-03-16 RX ORDER — AZITHROMYCIN 250 MG/1
TABLET, FILM COATED ORAL
Qty: 6 TABLET | Refills: 0 | Status: SHIPPED | OUTPATIENT
Start: 2020-03-16 | End: 2020-04-02 | Stop reason: ALTCHOICE

## 2020-03-17 ENCOUNTER — APPOINTMENT (OUTPATIENT)
Dept: FAMILY MEDICINE | Age: 35
End: 2020-03-17

## 2020-03-17 ENCOUNTER — TELEPHONE (OUTPATIENT)
Dept: SCHEDULING | Age: 35
End: 2020-03-17

## 2020-03-17 ENCOUNTER — TELEPHONE (OUTPATIENT)
Dept: FAMILY MEDICINE | Age: 35
End: 2020-03-17

## 2020-03-24 ENCOUNTER — E-ADVICE (OUTPATIENT)
Dept: PULMONOLOGY | Age: 35
End: 2020-03-24

## 2020-03-24 ENCOUNTER — IMAGING SERVICES (OUTPATIENT)
Dept: OTHER | Age: 35
End: 2020-03-24

## 2020-03-27 ENCOUNTER — TELEPHONE (OUTPATIENT)
Dept: PULMONOLOGY | Age: 35
End: 2020-03-27

## 2020-03-27 DIAGNOSIS — R06.02 SOB (SHORTNESS OF BREATH): ICD-10-CM

## 2020-03-27 DIAGNOSIS — R05.3 PERSISTENT COUGH: Primary | ICD-10-CM

## 2020-03-27 DIAGNOSIS — R06.09 DOE (DYSPNEA ON EXERTION): ICD-10-CM

## 2020-03-30 ENCOUNTER — LAB SERVICES (OUTPATIENT)
Dept: LAB | Age: 35
End: 2020-03-30

## 2020-03-30 DIAGNOSIS — R06.02 SOB (SHORTNESS OF BREATH): ICD-10-CM

## 2020-03-30 DIAGNOSIS — R05.3 PERSISTENT COUGH: ICD-10-CM

## 2020-03-30 DIAGNOSIS — R06.09 DOE (DYSPNEA ON EXERTION): ICD-10-CM

## 2020-03-30 LAB
ALBUMIN SERPL-MCNC: 3.9 G/DL (ref 3.6–5.1)
ALP SERPL-CCNC: 93 U/L (ref 45–130)
ALT SERPL W/O P-5'-P-CCNC: 11 U/L (ref 4–38)
ANISOCYTOSIS: NORMAL
AST SERPL-CCNC: 22 U/L (ref 14–43)
BASOPHIL %: 0 % (ref 0–1.2)
BASOPHIL ABSOLUTE #: 0 10*3/UL (ref 0–0.1)
BILIRUB SERPL-MCNC: 0.3 MG/DL (ref 0–1.3)
BUN SERPL-MCNC: 8 MG/DL (ref 7–20)
CALCIUM SERPL-MCNC: 9.4 MG/DL (ref 8.6–10.6)
CHLORIDE SERPL-SCNC: 100 MMOL/L (ref 96–107)
CO2 SERPL-SCNC: 26 MMOL/L (ref 22–32)
CREAT SERPL-MCNC: 0.5 MG/DL (ref 0.5–1.4)
DIFFERENTIAL TYPE: ABNORMAL
ELLIPTOCYTES: NORMAL
EOSINOPHIL %: 1.5 % (ref 0–10)
EOSINOPHIL ABSOLUTE #: 0.1 10*3/UL (ref 0–0.5)
GFR SERPL CREATININE-BSD FRML MDRD: >60 ML/MIN/{1.73M2}
GFR SERPL CREATININE-BSD FRML MDRD: >60 ML/MIN/{1.73M2}
GLUCOSE SERPL-MCNC: 89 MG/DL (ref 70–200)
HEMATOCRIT: 30.9 % (ref 34–45)
HEMOGLOBIN: 9.7 G/DL (ref 11.2–15.7)
HYPOCHROMASIA: NORMAL
LYMPH PERCENT: 21.6 % (ref 20.5–51.1)
LYMPHOCYTE ABSOLUTE #: 0.7 10*3/UL (ref 1.2–3.4)
MEAN CORPUSCULAR HGB CONCENTRATION: 31.4 % (ref 32–36)
MEAN CORPUSCULAR HGB: 24.3 PG (ref 27–34)
MEAN CORPUSCULAR VOLUME: 77.3 FL (ref 79–95)
MEAN PLATELET VOLUME: 11.8 FL (ref 8.6–12.4)
MONOCYTE ABSOLUTE #: 0.6 10*3/UL (ref 0.2–0.9)
MONOCYTE PERCENT: 18.5 % (ref 4.3–12.9)
NEUTROPHIL ABSOLUTE #: 1.9 10*3/UL (ref 1.4–6.5)
NEUTROPHIL PERCENT: 58.4 % (ref 34–73.5)
PLATELET COUNT: 161 10*3/UL (ref 150–400)
POTASSIUM SERPL-SCNC: 4.1 MMOL/L (ref 3.5–5.3)
PROT SERPL-MCNC: 6.5 G/DL (ref 6.4–8.5)
RED BLOOD CELL COUNT: 4 10*6/UL (ref 3.7–5.2)
RED CELL DISTRIBUTION WIDTH: 17.1 % (ref 11.3–14.8)
SODIUM SERPL-SCNC: 133 MMOL/L (ref 136–146)
WHITE BLOOD CELL COUNT: 3.3 10*3/UL (ref 4–10)

## 2020-03-30 PROCEDURE — 36415 COLL VENOUS BLD VENIPUNCTURE: CPT | Performed by: INTERNAL MEDICINE

## 2020-03-30 PROCEDURE — 80053 COMPREHEN METABOLIC PANEL: CPT | Performed by: INTERNAL MEDICINE

## 2020-03-30 PROCEDURE — 82164 ANGIOTENSIN I ENZYME TEST: CPT | Performed by: INTERNAL MEDICINE

## 2020-03-30 PROCEDURE — 85025 COMPLETE CBC W/AUTO DIFF WBC: CPT | Performed by: INTERNAL MEDICINE

## 2020-03-31 ENCOUNTER — TELEPHONE (OUTPATIENT)
Dept: OBGYN | Age: 35
End: 2020-03-31

## 2020-04-01 ENCOUNTER — TELEPHONE (OUTPATIENT)
Dept: PULMONOLOGY | Age: 35
End: 2020-04-01

## 2020-04-01 ENCOUNTER — IMAGING SERVICES (OUTPATIENT)
Dept: ULTRASOUND IMAGING | Age: 35
End: 2020-04-01
Attending: OBSTETRICS & GYNECOLOGY

## 2020-04-01 DIAGNOSIS — O30.041 DICHORIONIC DIAMNIOTIC TWIN PREGNANCY IN FIRST TRIMESTER: ICD-10-CM

## 2020-04-01 DIAGNOSIS — O30.041 DICHORIONIC DIAMNIOTIC TWIN PREGNANCY IN FIRST TRIMESTER: Primary | ICD-10-CM

## 2020-04-01 LAB — ACE SERPL-CCNC: 51 U/L (ref 9–67)

## 2020-04-01 PROCEDURE — 76814 OB US NUCHAL MEAS ADD-ON: CPT | Performed by: RADIOLOGY

## 2020-04-01 PROCEDURE — 76813 OB US NUCHAL MEAS 1 GEST: CPT | Performed by: RADIOLOGY

## 2020-04-02 ENCOUNTER — E-ADVICE (OUTPATIENT)
Dept: PULMONOLOGY | Age: 35
End: 2020-04-02

## 2020-04-02 ENCOUNTER — TELEPHONE (OUTPATIENT)
Dept: MATERNAL FETAL MEDICINE | Age: 35
End: 2020-04-02

## 2020-04-03 ENCOUNTER — E-VISIT (OUTPATIENT)
Dept: URGENT CARE | Age: 35
End: 2020-04-03

## 2020-04-03 ENCOUNTER — V-VISIT (OUTPATIENT)
Dept: PULMONOLOGY | Age: 35
End: 2020-04-03

## 2020-04-03 DIAGNOSIS — J45.991 COUGH VARIANT ASTHMA: Primary | ICD-10-CM

## 2020-04-03 DIAGNOSIS — R69 DIAGNOSIS DEFERRED: Primary | ICD-10-CM

## 2020-04-03 PROCEDURE — 99214 OFFICE O/P EST MOD 30 MIN: CPT | Performed by: INTERNAL MEDICINE

## 2020-04-03 RX ORDER — FLUTICASONE PROPIONATE AND SALMETEROL 500; 50 UG/1; UG/1
1 POWDER RESPIRATORY (INHALATION) 2 TIMES DAILY
Qty: 1 EACH | Refills: 11 | Status: SHIPPED | OUTPATIENT
Start: 2020-04-03 | End: 2020-09-01 | Stop reason: SDUPTHER

## 2020-04-03 ASSESSMENT — LIFESTYLE VARIABLES
EVER_SMOKED: I HAVE NEVER SMOKED
SMOKING_STATUS: NO

## 2020-04-08 ENCOUNTER — OB CHECK (OUTPATIENT)
Dept: OBGYN | Age: 35
End: 2020-04-08

## 2020-04-08 VITALS
TEMPERATURE: 99.1 F | DIASTOLIC BLOOD PRESSURE: 50 MMHG | HEIGHT: 65 IN | SYSTOLIC BLOOD PRESSURE: 100 MMHG | BODY MASS INDEX: 17.99 KG/M2 | WEIGHT: 108 LBS

## 2020-04-08 DIAGNOSIS — O09.629 SUPERVISION OF HIGH-RISK PREGNANCY OF YOUNG MULTIGRAVIDA: Primary | ICD-10-CM

## 2020-04-08 PROCEDURE — 0502F SUBSEQUENT PRENATAL CARE: CPT | Performed by: OBSTETRICS & GYNECOLOGY

## 2020-04-09 ENCOUNTER — E-ADVICE (OUTPATIENT)
Dept: OBGYN | Age: 35
End: 2020-04-09

## 2020-04-29 ENCOUNTER — E-ADVICE (OUTPATIENT)
Dept: OBGYN | Age: 35
End: 2020-04-29

## 2020-04-29 DIAGNOSIS — O99.019 ANEMIA AFFECTING PREGNANCY, ANTEPARTUM: Primary | ICD-10-CM

## 2020-05-02 ENCOUNTER — LAB SERVICES (OUTPATIENT)
Dept: LAB | Age: 35
End: 2020-05-02

## 2020-05-02 DIAGNOSIS — O99.019 ANEMIA AFFECTING PREGNANCY, ANTEPARTUM: ICD-10-CM

## 2020-05-02 LAB
ANISOCYTOSIS: NORMAL
BASOPHIL %: 0 % (ref 0–1.2)
BASOPHIL ABSOLUTE #: 0 10*3/UL (ref 0–0.1)
DIFFERENTIAL TYPE: ABNORMAL
EOSINOPHIL %: 1.7 % (ref 0–10)
EOSINOPHIL ABSOLUTE #: 0.1 10*3/UL (ref 0–0.5)
HEMATOCRIT: 30.5 % (ref 34–45)
HEMOGLOBIN: 9.7 G/DL (ref 11.2–15.7)
HYPOCHROMASIA: NORMAL
LYMPH PERCENT: 21 % (ref 20.5–51.1)
LYMPHOCYTE ABSOLUTE #: 0.7 10*3/UL (ref 1.2–3.4)
MEAN CORPUSCULAR HGB CONCENTRATION: 31.8 % (ref 32–36)
MEAN CORPUSCULAR HGB: 24.6 PG (ref 27–34)
MEAN CORPUSCULAR VOLUME: 77.4 FL (ref 79–95)
MEAN PLATELET VOLUME: 11.1 FL (ref 8.6–12.4)
MONOCYTE ABSOLUTE #: 0.6 10*3/UL (ref 0.2–0.9)
MONOCYTE PERCENT: 16.4 % (ref 4.3–12.9)
NEUTROPHIL ABSOLUTE #: 2.2 10*3/UL (ref 1.4–6.5)
NEUTROPHIL PERCENT: 60.9 % (ref 34–73.5)
PLATELET COUNT: 131 10*3/UL (ref 150–400)
RED BLOOD CELL COUNT: 3.94 10*6/UL (ref 3.7–5.2)
RED CELL DISTRIBUTION WIDTH: 16 % (ref 11.3–14.8)
WHITE BLOOD CELL COUNT: 3.5 10*3/UL (ref 4–10)

## 2020-05-02 PROCEDURE — 85025 COMPLETE CBC W/AUTO DIFF WBC: CPT | Performed by: OBSTETRICS & GYNECOLOGY

## 2020-05-02 PROCEDURE — 36415 COLL VENOUS BLD VENIPUNCTURE: CPT | Performed by: OBSTETRICS & GYNECOLOGY

## 2020-05-06 ENCOUNTER — CLINICAL ABSTRACT (OUTPATIENT)
Dept: MATERNAL FETAL MEDICINE | Age: 35
End: 2020-05-06

## 2020-05-06 ENCOUNTER — TELEPHONE (OUTPATIENT)
Dept: MATERNAL FETAL MEDICINE | Age: 35
End: 2020-05-06

## 2020-05-06 ENCOUNTER — OB CHECK (OUTPATIENT)
Dept: OBGYN | Age: 35
End: 2020-05-06

## 2020-05-06 VITALS
HEART RATE: 102 BPM | TEMPERATURE: 97.9 F | DIASTOLIC BLOOD PRESSURE: 50 MMHG | BODY MASS INDEX: 19.16 KG/M2 | SYSTOLIC BLOOD PRESSURE: 100 MMHG | WEIGHT: 115 LBS | HEIGHT: 65 IN

## 2020-05-06 DIAGNOSIS — O30.042 DICHORIONIC DIAMNIOTIC TWIN PREGNANCY IN SECOND TRIMESTER: ICD-10-CM

## 2020-05-06 DIAGNOSIS — O09.629 SUPERVISION OF HIGH-RISK PREGNANCY OF YOUNG MULTIGRAVIDA: Primary | ICD-10-CM

## 2020-05-06 PROCEDURE — 0502F SUBSEQUENT PRENATAL CARE: CPT | Performed by: OBSTETRICS & GYNECOLOGY

## 2020-05-07 ENCOUNTER — OFFICE VISIT (OUTPATIENT)
Dept: MATERNAL FETAL MEDICINE | Age: 35
End: 2020-05-07
Attending: OBSTETRICS & GYNECOLOGY

## 2020-05-07 ENCOUNTER — TELEPHONE (OUTPATIENT)
Dept: OBGYN | Age: 35
End: 2020-05-07

## 2020-05-07 ENCOUNTER — PRIOR ORIGINAL RECORDS (OUTPATIENT)
Dept: HEALTH INFORMATION MANAGEMENT | Facility: OTHER | Age: 35
End: 2020-05-07

## 2020-05-07 VITALS
HEIGHT: 65 IN | DIASTOLIC BLOOD PRESSURE: 58 MMHG | WEIGHT: 114 LBS | SYSTOLIC BLOOD PRESSURE: 102 MMHG | BODY MASS INDEX: 18.99 KG/M2

## 2020-05-07 DIAGNOSIS — O30.042 DICHORIONIC DIAMNIOTIC TWIN PREGNANCY IN SECOND TRIMESTER: Primary | ICD-10-CM

## 2020-05-07 DIAGNOSIS — J45.991 COUGH VARIANT ASTHMA: Primary | ICD-10-CM

## 2020-05-07 DIAGNOSIS — O09.299 HX OF PRE-ECLAMPSIA IN PRIOR PREGNANCY, CURRENTLY PREGNANT: Primary | ICD-10-CM

## 2020-05-07 PROCEDURE — 99243 OFF/OP CNSLTJ NEW/EST LOW 30: CPT | Performed by: OBSTETRICS & GYNECOLOGY

## 2020-05-07 PROCEDURE — 76817 TRANSVAGINAL US OBSTETRIC: CPT

## 2020-05-07 PROCEDURE — 76810 OB US >/= 14 WKS ADDL FETUS: CPT

## 2020-05-07 PROCEDURE — 76805 OB US >/= 14 WKS SNGL FETUS: CPT | Performed by: OBSTETRICS & GYNECOLOGY

## 2020-05-08 ENCOUNTER — TELEPHONE (OUTPATIENT)
Dept: OBGYN | Age: 35
End: 2020-05-08

## 2020-05-08 DIAGNOSIS — R01.1 HEART MURMUR: ICD-10-CM

## 2020-05-08 DIAGNOSIS — O30.042 DICHORIONIC DIAMNIOTIC TWIN PREGNANCY IN SECOND TRIMESTER: Primary | ICD-10-CM

## 2020-05-08 RX ORDER — ENOXAPARIN SODIUM 100 MG/ML
40 INJECTION SUBCUTANEOUS DAILY
Qty: 12 ML | Refills: 11 | Status: SHIPPED | OUTPATIENT
Start: 2020-05-08 | End: 2021-03-15 | Stop reason: ALTCHOICE

## 2020-05-20 ENCOUNTER — TELEPHONE (OUTPATIENT)
Dept: MATERNAL FETAL MEDICINE | Age: 35
End: 2020-05-20

## 2020-05-21 ENCOUNTER — OFFICE VISIT (OUTPATIENT)
Dept: MATERNAL FETAL MEDICINE | Age: 35
End: 2020-05-21
Attending: OBSTETRICS & GYNECOLOGY

## 2020-05-21 DIAGNOSIS — D68.59 PROTEIN S DEFICIENCY (CMD): ICD-10-CM

## 2020-05-21 DIAGNOSIS — Z98.891 HISTORY OF CESAREAN SECTION: ICD-10-CM

## 2020-05-21 DIAGNOSIS — O30.049 TWIN PREGNANCY, TWINS DICHORIONIC AND DIAMNIOTIC: Primary | ICD-10-CM

## 2020-05-21 DIAGNOSIS — Z87.59 HISTORY OF PRE-ECLAMPSIA: ICD-10-CM

## 2020-05-21 DIAGNOSIS — O30.042 DICHORIONIC DIAMNIOTIC TWIN PREGNANCY IN SECOND TRIMESTER: ICD-10-CM

## 2020-05-21 PROCEDURE — 76817 TRANSVAGINAL US OBSTETRIC: CPT | Performed by: OBSTETRICS & GYNECOLOGY

## 2020-05-22 ENCOUNTER — TELEPHONE (OUTPATIENT)
Dept: OBGYN | Age: 35
End: 2020-05-22

## 2020-05-22 DIAGNOSIS — O30.042 DICHORIONIC DIAMNIOTIC TWIN PREGNANCY IN SECOND TRIMESTER: Primary | ICD-10-CM

## 2020-05-22 DIAGNOSIS — Z98.891 HISTORY OF C-SECTION: ICD-10-CM

## 2020-05-22 DIAGNOSIS — O09.292 HISTORY OF PRE-ECLAMPSIA IN PRIOR PREGNANCY, CURRENTLY PREGNANT IN SECOND TRIMESTER: ICD-10-CM

## 2020-05-22 DIAGNOSIS — J45.909 MATERNAL ASTHMA: ICD-10-CM

## 2020-05-22 DIAGNOSIS — O99.519 MATERNAL ASTHMA: ICD-10-CM

## 2020-05-30 ENCOUNTER — LAB SERVICES (OUTPATIENT)
Dept: LAB | Age: 35
End: 2020-05-30

## 2020-05-30 DIAGNOSIS — Z83.2 FAMILY HISTORY OF LUPUS ANTICOAGULANT DISORDER: Primary | ICD-10-CM

## 2020-05-30 DIAGNOSIS — O99.019 ANEMIA AFFECTING PREGNANCY, ANTEPARTUM: ICD-10-CM

## 2020-05-30 LAB
ANISOCYTOSIS: ABNORMAL
BANDS: 1 % (ref 0–5)
DIFFERENTIAL TYPE: ABNORMAL
EOSINOPHIL % MD: 2 % (ref 0–10)
EOSINOPHIL ABSOLUTE # MD: 0.1 /UL (ref 0–0.5)
HEMATOCRIT: 29 % (ref 34–45)
HEMOGLOBIN: 9.3 G/DL (ref 11.2–15.7)
HYPOCHROMASIA: ABNORMAL
LYMPH PERCENT MD: 23 % (ref 20.5–51.1)
LYMPHOCYTE ABSOLUTE # MD: 0.7 /UL (ref 1.2–3.4)
MEAN CORPUSCULAR HGB CONCENTRATION: 32.1 % (ref 32–36)
MEAN CORPUSCULAR HGB: 25.1 PG (ref 27–34)
MEAN CORPUSCULAR VOLUME: 78.4 FL (ref 79–95)
MEAN PLATELET VOLUME: 11 FL (ref 8.6–12.4)
MONOCYTE ABSOLUTE # MD: 0.3 /UL (ref 0.2–0.9)
MONOCYTE PERCENT MD: 10 % (ref 4.3–12.9)
NEUTROPHIL ABSOLUTE # MD: 2 /UL (ref 1.4–6.5)
NEUTROPHIL PERCENT MD: 64 % (ref 34–73.5)
PLATELET COUNT: 138 10*3/UL (ref 150–400)
POLYCHROMASIA: ABNORMAL
RED BLOOD CELL COUNT: 3.7 10*6/UL (ref 3.7–5.2)
RED CELL DISTRIBUTION WIDTH: 15.2 % (ref 11.3–14.8)
WHITE BLOOD CELL COUNT: 3.1 10*3/UL (ref 4–10)

## 2020-05-30 PROCEDURE — 36415 COLL VENOUS BLD VENIPUNCTURE: CPT | Performed by: OBSTETRICS & GYNECOLOGY

## 2020-05-30 PROCEDURE — 86235 NUCLEAR ANTIGEN ANTIBODY: CPT | Performed by: OBSTETRICS & GYNECOLOGY

## 2020-05-30 PROCEDURE — 85025 COMPLETE CBC W/AUTO DIFF WBC: CPT | Performed by: OBSTETRICS & GYNECOLOGY

## 2020-06-01 DIAGNOSIS — Z86.2 HISTORY OF ANEMIA: Primary | ICD-10-CM

## 2020-06-01 LAB
ENA RNP IGG SER IA-ACNC: <0.3 E U/ML (ref 0–7)
LACV IGG SER IA-ACNC: <0.3 E U/ML (ref 0–7)

## 2020-06-02 ENCOUNTER — TELEPHONE (OUTPATIENT)
Dept: MATERNAL FETAL MEDICINE | Age: 35
End: 2020-06-02

## 2020-06-02 LAB — FAX RESULTS: NORMAL

## 2020-06-03 ENCOUNTER — OB CHECK (OUTPATIENT)
Dept: OBGYN | Age: 35
End: 2020-06-03

## 2020-06-03 VITALS
SYSTOLIC BLOOD PRESSURE: 90 MMHG | TEMPERATURE: 98.7 F | WEIGHT: 120 LBS | HEIGHT: 65 IN | BODY MASS INDEX: 19.99 KG/M2 | DIASTOLIC BLOOD PRESSURE: 60 MMHG | HEART RATE: 99 BPM

## 2020-06-03 DIAGNOSIS — O30.042 DICHORIONIC DIAMNIOTIC TWIN PREGNANCY IN SECOND TRIMESTER: ICD-10-CM

## 2020-06-03 DIAGNOSIS — O09.629 SUPERVISION OF HIGH-RISK PREGNANCY OF YOUNG MULTIGRAVIDA: Primary | ICD-10-CM

## 2020-06-03 PROCEDURE — 0502F SUBSEQUENT PRENATAL CARE: CPT | Performed by: OBSTETRICS & GYNECOLOGY

## 2020-06-04 ENCOUNTER — OFFICE VISIT (OUTPATIENT)
Dept: MATERNAL FETAL MEDICINE | Age: 35
End: 2020-06-04
Attending: OBSTETRICS & GYNECOLOGY

## 2020-06-04 ENCOUNTER — TELEPHONE (OUTPATIENT)
Dept: OBGYN | Age: 35
End: 2020-06-04

## 2020-06-04 DIAGNOSIS — D68.59 PROTEIN S DEFICIENCY (CMD): ICD-10-CM

## 2020-06-04 DIAGNOSIS — O30.042 DICHORIONIC DIAMNIOTIC TWIN PREGNANCY IN SECOND TRIMESTER: ICD-10-CM

## 2020-06-04 DIAGNOSIS — O30.049 DICHORIONIC DIAMNIOTIC TWIN PREGNANCY, ANTEPARTUM: Primary | ICD-10-CM

## 2020-06-04 DIAGNOSIS — D86.9 SARCOIDOSIS: ICD-10-CM

## 2020-06-04 DIAGNOSIS — O35.03X2 CHOROID PLEXUS CYST OF FETUS AFFECTING CARE OF MOTHER, ANTEPARTUM, FETUS 2 OF MULTIPLE GESTATION: Primary | ICD-10-CM

## 2020-06-04 DIAGNOSIS — E07.9 ASYMMETRICAL THYROID: ICD-10-CM

## 2020-06-04 DIAGNOSIS — O30.049 TWIN PREGNANCY, TWINS DICHORIONIC AND DIAMNIOTIC: ICD-10-CM

## 2020-06-04 DIAGNOSIS — O09.299 HX OF PRE-ECLAMPSIA IN PRIOR PREGNANCY, CURRENTLY PREGNANT: ICD-10-CM

## 2020-06-04 DIAGNOSIS — O40.2XX2 POLYHYDRAMNIOS IN SECOND TRIMESTER, FETUS 2 OF MULTIPLE GESTATION: ICD-10-CM

## 2020-06-04 DIAGNOSIS — R01.1 HEART MURMUR: ICD-10-CM

## 2020-06-04 DIAGNOSIS — Z98.891 HISTORY OF CESAREAN SECTION: ICD-10-CM

## 2020-06-04 DIAGNOSIS — J45.909 ASTHMA: ICD-10-CM

## 2020-06-04 DIAGNOSIS — R76.0 LUPUS ANTICOAGULANT POSITIVE: ICD-10-CM

## 2020-06-04 DIAGNOSIS — Z87.59 HISTORY OF PRE-ECLAMPSIA: ICD-10-CM

## 2020-06-04 DIAGNOSIS — J45.991 COUGH VARIANT ASTHMA: ICD-10-CM

## 2020-06-04 DIAGNOSIS — Z98.891 HISTORY OF LOW TRANSVERSE CESAREAN SECTION: ICD-10-CM

## 2020-06-04 PROCEDURE — 76811 OB US DETAILED SNGL FETUS: CPT | Performed by: OBSTETRICS & GYNECOLOGY

## 2020-06-04 PROCEDURE — 76812 OB US DETAILED ADDL FETUS: CPT | Performed by: OBSTETRICS & GYNECOLOGY

## 2020-06-04 PROCEDURE — 76817 TRANSVAGINAL US OBSTETRIC: CPT | Performed by: OBSTETRICS & GYNECOLOGY

## 2020-06-05 ENCOUNTER — LAB SERVICES (OUTPATIENT)
Dept: LAB | Age: 35
End: 2020-06-05

## 2020-06-05 DIAGNOSIS — O30.042 DICHORIONIC DIAMNIOTIC TWIN PREGNANCY IN SECOND TRIMESTER: ICD-10-CM

## 2020-06-05 DIAGNOSIS — Z83.2 FAMILY HISTORY OF DISEASES OF THE BLOOD AND BLOOD-FORMING ORGANS AND CERTAIN DISORDERS INVOLVING THE IMMUNE MECHANISM: ICD-10-CM

## 2020-06-05 DIAGNOSIS — O99.019 ANEMIA AFFECTING PREGNANCY, ANTEPARTUM: ICD-10-CM

## 2020-06-05 LAB — GLUCOSE 1H P 50 G GLC PO SERPL-MCNC: 103 MG/DL (ref 70–130)

## 2020-06-05 PROCEDURE — 82950 GLUCOSE TEST: CPT | Performed by: OBSTETRICS & GYNECOLOGY

## 2020-06-05 PROCEDURE — 36415 COLL VENOUS BLD VENIPUNCTURE: CPT | Performed by: OBSTETRICS & GYNECOLOGY

## 2020-06-12 RX ORDER — ALBUTEROL SULFATE 2.5 MG/3ML
SOLUTION RESPIRATORY (INHALATION)
Qty: 375 ML | Refills: 6 | Status: SHIPPED | OUTPATIENT
Start: 2020-06-12

## 2020-06-15 ENCOUNTER — LAB SERVICES (OUTPATIENT)
Dept: LAB | Age: 35
End: 2020-06-15

## 2020-06-15 ENCOUNTER — NURSE ONLY (OUTPATIENT)
Dept: OBGYN | Age: 35
End: 2020-06-15

## 2020-06-15 VITALS — WEIGHT: 123 LBS | BODY MASS INDEX: 20.47 KG/M2

## 2020-06-15 DIAGNOSIS — O35.03X2 CHOROID PLEXUS CYST OF FETUS AFFECTING CARE OF MOTHER, ANTEPARTUM, FETUS 2 OF MULTIPLE GESTATION: ICD-10-CM

## 2020-06-15 DIAGNOSIS — O35.03X2 CHOROID PLEXUS CYST OF FETUS AFFECTING CARE OF MOTHER, ANTEPARTUM, FETUS 2 OF MULTIPLE GESTATION: Primary | ICD-10-CM

## 2020-06-15 DIAGNOSIS — O40.2XX2 POLYHYDRAMNIOS IN SECOND TRIMESTER, FETUS 2 OF MULTIPLE GESTATION: ICD-10-CM

## 2020-06-15 DIAGNOSIS — O99.019 ANEMIA AFFECTING PREGNANCY, ANTEPARTUM: ICD-10-CM

## 2020-06-15 DIAGNOSIS — O30.042 DICHORIONIC DIAMNIOTIC TWIN PREGNANCY IN SECOND TRIMESTER: ICD-10-CM

## 2020-06-15 PROCEDURE — 36415 COLL VENOUS BLD VENIPUNCTURE: CPT | Performed by: OBSTETRICS & GYNECOLOGY

## 2020-06-15 PROCEDURE — X1094 NO CHARGE VISIT: HCPCS

## 2020-06-15 PROCEDURE — 85025 COMPLETE CBC W/AUTO DIFF WBC: CPT | Performed by: OBSTETRICS & GYNECOLOGY

## 2020-06-16 ENCOUNTER — TELEPHONE (OUTPATIENT)
Dept: HEMATOLOGY/ONCOLOGY | Age: 35
End: 2020-06-16

## 2020-06-16 DIAGNOSIS — O99.012 ANEMIA AFFECTING PREGNANCY IN SECOND TRIMESTER: Primary | ICD-10-CM

## 2020-06-16 LAB
ANISOCYTOSIS: NORMAL
BASOPHIL %: 0 % (ref 0–1.2)
BASOPHIL ABSOLUTE #: 0 10*3/UL (ref 0–0.1)
DIFFERENTIAL TYPE: ABNORMAL
ELLIPTOCYTES: NORMAL
EOSINOPHIL %: 1.6 % (ref 0–10)
EOSINOPHIL ABSOLUTE #: 0.1 10*3/UL (ref 0–0.5)
HEMATOCRIT: 26.6 % (ref 34–45)
HEMOGLOBIN: 8.3 G/DL (ref 11.2–15.7)
LYMPH PERCENT: 18.1 % (ref 20.5–51.1)
LYMPHOCYTE ABSOLUTE #: 0.7 10*3/UL (ref 1.2–3.4)
MEAN CORPUSCULAR HGB CONCENTRATION: 31.2 % (ref 32–36)
MEAN CORPUSCULAR HGB: 23.8 PG (ref 27–34)
MEAN CORPUSCULAR VOLUME: 76.2 FL (ref 79–95)
MEAN PLATELET VOLUME: 11 FL (ref 8.6–12.4)
MONOCYTE ABSOLUTE #: 0.6 10*3/UL (ref 0.2–0.9)
MONOCYTE PERCENT: 17 % (ref 4.3–12.9)
NEUTROPHIL ABSOLUTE #: 2.4 10*3/UL (ref 1.4–6.5)
NEUTROPHIL PERCENT: 63.3 % (ref 34–73.5)
PLATELET COUNT: 143 10*3/UL (ref 150–400)
POLYCHROMASIA: NORMAL
RED BLOOD CELL COUNT: 3.49 10*6/UL (ref 3.7–5.2)
RED CELL DISTRIBUTION WIDTH: 14.3 % (ref 11.3–14.8)
WHITE BLOOD CELL COUNT: 3.7 10*3/UL (ref 4–10)

## 2020-06-17 ENCOUNTER — TELEPHONE (OUTPATIENT)
Dept: MATERNAL FETAL MEDICINE | Age: 35
End: 2020-06-17

## 2020-06-18 ENCOUNTER — OFFICE VISIT (OUTPATIENT)
Dept: MATERNAL FETAL MEDICINE | Age: 35
End: 2020-06-18
Attending: OBSTETRICS & GYNECOLOGY

## 2020-06-18 DIAGNOSIS — Z87.59 HISTORY OF PRE-ECLAMPSIA: ICD-10-CM

## 2020-06-18 DIAGNOSIS — O30.042 DICHORIONIC DIAMNIOTIC TWIN PREGNANCY IN SECOND TRIMESTER: ICD-10-CM

## 2020-06-18 DIAGNOSIS — O30.049 TWIN PREGNANCY, TWINS DICHORIONIC AND DIAMNIOTIC: Primary | ICD-10-CM

## 2020-06-18 DIAGNOSIS — Z98.891 HISTORY OF CESAREAN SECTION: ICD-10-CM

## 2020-06-18 PROCEDURE — X1094 NO CHARGE VISIT: HCPCS | Performed by: OBSTETRICS & GYNECOLOGY

## 2020-06-18 PROCEDURE — 76817 TRANSVAGINAL US OBSTETRIC: CPT | Performed by: OBSTETRICS & GYNECOLOGY

## 2020-06-23 ENCOUNTER — HOSPITAL (OUTPATIENT)
Dept: OTHER | Age: 35
End: 2020-06-23
Attending: INTERNAL MEDICINE

## 2020-06-24 ENCOUNTER — TELEPHONE (OUTPATIENT)
Dept: HEMATOLOGY/ONCOLOGY | Age: 35
End: 2020-06-24

## 2020-06-25 ENCOUNTER — OFFICE VISIT (OUTPATIENT)
Dept: HEMATOLOGY/ONCOLOGY | Age: 35
End: 2020-06-25

## 2020-06-25 VITALS
RESPIRATION RATE: 16 BRPM | DIASTOLIC BLOOD PRESSURE: 64 MMHG | HEART RATE: 84 BPM | SYSTOLIC BLOOD PRESSURE: 102 MMHG | TEMPERATURE: 98.2 F

## 2020-06-25 DIAGNOSIS — O99.012 ANEMIA AFFECTING PREGNANCY IN SECOND TRIMESTER: Primary | ICD-10-CM

## 2020-06-26 ENCOUNTER — TELEPHONE (OUTPATIENT)
Dept: HEMATOLOGY/ONCOLOGY | Age: 35
End: 2020-06-26

## 2020-06-26 LAB — SEND OUT RESULTS: NORMAL

## 2020-06-29 ENCOUNTER — TELEPHONE (OUTPATIENT)
Dept: HEMATOLOGY/ONCOLOGY | Age: 35
End: 2020-06-29

## 2020-06-30 ENCOUNTER — LAB SERVICES (OUTPATIENT)
Dept: LAB | Age: 35
End: 2020-06-30

## 2020-06-30 ENCOUNTER — OFFICE VISIT (OUTPATIENT)
Dept: HEMATOLOGY/ONCOLOGY | Age: 35
End: 2020-06-30

## 2020-06-30 DIAGNOSIS — Z86.2 HISTORY OF ANEMIA: ICD-10-CM

## 2020-06-30 DIAGNOSIS — O99.012 ANEMIA AFFECTING PREGNANCY IN SECOND TRIMESTER: Primary | ICD-10-CM

## 2020-06-30 LAB
ANISOCYTOSIS: NORMAL
BASOPHIL %: 0 % (ref 0–1.2)
BASOPHIL ABSOLUTE #: 0 10*3/UL (ref 0–0.1)
DIFFERENTIAL TYPE: ABNORMAL
EOSINOPHIL %: 1.6 % (ref 0–10)
EOSINOPHIL ABSOLUTE #: 0.1 10*3/UL (ref 0–0.5)
HEMATOCRIT: 26.3 % (ref 34–45)
HEMOGLOBIN: 8.2 G/DL (ref 11.2–15.7)
HYPOCHROMASIA: NORMAL
LYMPH PERCENT: 23.9 % (ref 20.5–51.1)
LYMPHOCYTE ABSOLUTE #: 0.9 10*3/UL (ref 1.2–3.4)
MEAN CORPUSCULAR HGB CONCENTRATION: 31.2 % (ref 32–36)
MEAN CORPUSCULAR HGB: 23.6 PG (ref 27–34)
MEAN CORPUSCULAR VOLUME: 75.8 FL (ref 79–95)
MEAN PLATELET VOLUME: 11.1 FL (ref 8.6–12.4)
MICROCYTES: NORMAL
MONOCYTE ABSOLUTE #: 0.6 10*3/UL (ref 0.2–0.9)
MONOCYTE PERCENT: 16 % (ref 4.3–12.9)
NEUTROPHIL ABSOLUTE #: 2.2 10*3/UL (ref 1.4–6.5)
NEUTROPHIL PERCENT: 58.5 % (ref 34–73.5)
PLATELET COUNT: 142 10*3/UL (ref 150–400)
RED BLOOD CELL COUNT: 3.47 10*6/UL (ref 3.7–5.2)
RED CELL DISTRIBUTION WIDTH: 15 % (ref 11.3–14.8)
WHITE BLOOD CELL COUNT: 3.8 10*3/UL (ref 4–10)

## 2020-06-30 PROCEDURE — 36415 COLL VENOUS BLD VENIPUNCTURE: CPT | Performed by: OBSTETRICS & GYNECOLOGY

## 2020-06-30 PROCEDURE — 85025 COMPLETE CBC W/AUTO DIFF WBC: CPT | Performed by: OBSTETRICS & GYNECOLOGY

## 2020-06-30 ASSESSMENT — PAIN SCALES - GENERAL: PAINLEVEL: 0

## 2020-07-01 ENCOUNTER — TELEPHONE (OUTPATIENT)
Dept: MATERNAL FETAL MEDICINE | Age: 35
End: 2020-07-01

## 2020-07-01 ENCOUNTER — TELEPHONE (OUTPATIENT)
Dept: OBGYN | Age: 35
End: 2020-07-01

## 2020-07-01 ENCOUNTER — HOSPITAL (OUTPATIENT)
Dept: OTHER | Age: 35
End: 2020-07-01
Attending: INTERNAL MEDICINE

## 2020-07-01 ENCOUNTER — OB CHECK (OUTPATIENT)
Dept: OBGYN | Age: 35
End: 2020-07-01

## 2020-07-01 VITALS
SYSTOLIC BLOOD PRESSURE: 90 MMHG | HEIGHT: 65 IN | WEIGHT: 124 LBS | BODY MASS INDEX: 20.66 KG/M2 | HEART RATE: 99 BPM | OXYGEN SATURATION: 99 % | DIASTOLIC BLOOD PRESSURE: 50 MMHG | TEMPERATURE: 99.3 F

## 2020-07-01 DIAGNOSIS — O09.629 SUPERVISION OF HIGH-RISK PREGNANCY OF YOUNG MULTIGRAVIDA: ICD-10-CM

## 2020-07-01 DIAGNOSIS — O09.92 SUPERVISION OF HIGH RISK PREGNANCY IN SECOND TRIMESTER: Primary | ICD-10-CM

## 2020-07-01 DIAGNOSIS — O30.042 DICHORIONIC DIAMNIOTIC TWIN PREGNANCY IN SECOND TRIMESTER: ICD-10-CM

## 2020-07-01 DIAGNOSIS — O99.012 ANEMIA AFFECTING PREGNANCY IN SECOND TRIMESTER: ICD-10-CM

## 2020-07-01 PROCEDURE — 0502F SUBSEQUENT PRENATAL CARE: CPT | Performed by: OBSTETRICS & GYNECOLOGY

## 2020-07-02 ENCOUNTER — OFFICE VISIT (OUTPATIENT)
Dept: MATERNAL FETAL MEDICINE | Age: 35
End: 2020-07-02
Attending: OBSTETRICS & GYNECOLOGY

## 2020-07-02 DIAGNOSIS — E07.9 ASYMMETRICAL THYROID: ICD-10-CM

## 2020-07-02 DIAGNOSIS — O30.049 DICHORIONIC DIAMNIOTIC TWIN PREGNANCY, ANTEPARTUM: Primary | ICD-10-CM

## 2020-07-02 DIAGNOSIS — R01.1 HEART MURMUR: ICD-10-CM

## 2020-07-02 DIAGNOSIS — Z86.2 HISTORY OF ANEMIA: ICD-10-CM

## 2020-07-02 DIAGNOSIS — O30.049 TWIN PREGNANCY, TWINS DICHORIONIC AND DIAMNIOTIC: ICD-10-CM

## 2020-07-02 DIAGNOSIS — J45.991 COUGH VARIANT ASTHMA: ICD-10-CM

## 2020-07-02 PROCEDURE — 76817 TRANSVAGINAL US OBSTETRIC: CPT | Performed by: OBSTETRICS & GYNECOLOGY

## 2020-07-02 PROCEDURE — 76816 OB US FOLLOW-UP PER FETUS: CPT | Performed by: OBSTETRICS & GYNECOLOGY

## 2020-07-03 ENCOUNTER — TELEPHONE (OUTPATIENT)
Dept: OBGYN | Age: 35
End: 2020-07-03

## 2020-07-09 ENCOUNTER — TELEPHONE (OUTPATIENT)
Dept: HEMATOLOGY/ONCOLOGY | Age: 35
End: 2020-07-09

## 2020-07-10 ENCOUNTER — OFFICE VISIT (OUTPATIENT)
Dept: HEMATOLOGY/ONCOLOGY | Age: 35
End: 2020-07-10

## 2020-07-10 DIAGNOSIS — O99.012 ANEMIA OF PREGNANCY IN SECOND TRIMESTER: Primary | ICD-10-CM

## 2020-07-10 ASSESSMENT — PAIN SCALES - GENERAL: PAINLEVEL: 0

## 2020-07-14 ENCOUNTER — TELEPHONE (OUTPATIENT)
Dept: HEMATOLOGY/ONCOLOGY | Age: 35
End: 2020-07-14

## 2020-07-15 ENCOUNTER — OFFICE VISIT (OUTPATIENT)
Dept: HEMATOLOGY/ONCOLOGY | Age: 35
End: 2020-07-15

## 2020-07-15 VITALS
TEMPERATURE: 98.1 F | RESPIRATION RATE: 20 BRPM | SYSTOLIC BLOOD PRESSURE: 112 MMHG | DIASTOLIC BLOOD PRESSURE: 56 MMHG | HEART RATE: 96 BPM

## 2020-07-15 DIAGNOSIS — O99.012 ANEMIA OF PREGNANCY IN SECOND TRIMESTER: Primary | ICD-10-CM

## 2020-07-22 ENCOUNTER — TELEPHONE (OUTPATIENT)
Dept: HEMATOLOGY/ONCOLOGY | Age: 35
End: 2020-07-22

## 2020-07-22 ENCOUNTER — TELEPHONE (OUTPATIENT)
Dept: OBGYN | Age: 35
End: 2020-07-22

## 2020-07-22 DIAGNOSIS — O30.002 TWIN GESTATION IN SECOND TRIMESTER, UNSPECIFIED MULTIPLE GESTATION TYPE: Primary | ICD-10-CM

## 2020-07-23 ENCOUNTER — OFFICE VISIT (OUTPATIENT)
Dept: HEMATOLOGY/ONCOLOGY | Age: 35
End: 2020-07-23

## 2020-07-23 VITALS
HEART RATE: 100 BPM | SYSTOLIC BLOOD PRESSURE: 110 MMHG | TEMPERATURE: 97.9 F | DIASTOLIC BLOOD PRESSURE: 62 MMHG | RESPIRATION RATE: 18 BRPM

## 2020-07-23 DIAGNOSIS — O99.012 ANEMIA AFFECTING PREGNANCY IN SECOND TRIMESTER: Primary | ICD-10-CM

## 2020-07-25 ENCOUNTER — TELEPHONE (OUTPATIENT)
Dept: OBGYN | Age: 35
End: 2020-07-25

## 2020-07-25 PROCEDURE — 99218 INITIAL OBSERVATION CARE,LEVL I: CPT | Performed by: OBSTETRICS & GYNECOLOGY

## 2020-07-28 ENCOUNTER — E-ADVICE (OUTPATIENT)
Dept: OBGYN | Age: 35
End: 2020-07-28

## 2020-07-28 DIAGNOSIS — O30.009 TWIN PREGNANCY, ANTEPARTUM, UNSPECIFIED MULTIPLE GESTATION TYPE: ICD-10-CM

## 2020-07-29 ENCOUNTER — OB CHECK (OUTPATIENT)
Dept: OBGYN | Age: 35
End: 2020-07-29

## 2020-07-29 ENCOUNTER — TELEPHONE (OUTPATIENT)
Dept: MATERNAL FETAL MEDICINE | Age: 35
End: 2020-07-29

## 2020-07-29 VITALS
WEIGHT: 127 LBS | DIASTOLIC BLOOD PRESSURE: 50 MMHG | BODY MASS INDEX: 21.16 KG/M2 | HEART RATE: 98 BPM | SYSTOLIC BLOOD PRESSURE: 100 MMHG | HEIGHT: 65 IN | TEMPERATURE: 97.9 F

## 2020-07-29 DIAGNOSIS — D50.9 IRON DEFICIENCY ANEMIA, UNSPECIFIED IRON DEFICIENCY ANEMIA TYPE: ICD-10-CM

## 2020-07-29 DIAGNOSIS — O09.629 SUPERVISION OF HIGH-RISK PREGNANCY OF YOUNG MULTIGRAVIDA: Primary | ICD-10-CM

## 2020-07-29 DIAGNOSIS — O30.043 DICHORIONIC DIAMNIOTIC TWIN PREGNANCY IN THIRD TRIMESTER: ICD-10-CM

## 2020-07-29 PROCEDURE — 0502F SUBSEQUENT PRENATAL CARE: CPT | Performed by: OBSTETRICS & GYNECOLOGY

## 2020-07-30 ENCOUNTER — OFFICE VISIT (OUTPATIENT)
Dept: MATERNAL FETAL MEDICINE | Age: 35
End: 2020-07-30
Attending: OBSTETRICS & GYNECOLOGY

## 2020-07-30 ENCOUNTER — EXTERNAL RECORD (OUTPATIENT)
Dept: HEALTH INFORMATION MANAGEMENT | Facility: OTHER | Age: 35
End: 2020-07-30

## 2020-07-30 ENCOUNTER — PRIOR ORIGINAL RECORDS (OUTPATIENT)
Dept: HEALTH INFORMATION MANAGEMENT | Facility: OTHER | Age: 35
End: 2020-07-30

## 2020-07-30 DIAGNOSIS — R01.1 HEART MURMUR: Primary | ICD-10-CM

## 2020-07-30 PROCEDURE — 76816 OB US FOLLOW-UP PER FETUS: CPT | Performed by: OBSTETRICS & GYNECOLOGY

## 2020-07-31 ENCOUNTER — LAB SERVICES (OUTPATIENT)
Dept: LAB | Age: 35
End: 2020-07-31

## 2020-07-31 ENCOUNTER — TELEPHONE (OUTPATIENT)
Dept: OBGYN | Age: 35
End: 2020-07-31

## 2020-07-31 ENCOUNTER — DOCUMENTATION (OUTPATIENT)
Dept: OBGYN | Age: 35
End: 2020-07-31

## 2020-07-31 DIAGNOSIS — O99.012 ANEMIA AFFECTING PREGNANCY IN SECOND TRIMESTER: ICD-10-CM

## 2020-07-31 DIAGNOSIS — D50.9 IRON DEFICIENCY ANEMIA, UNSPECIFIED IRON DEFICIENCY ANEMIA TYPE: ICD-10-CM

## 2020-07-31 DIAGNOSIS — O09.92 SUPERVISION OF HIGH RISK PREGNANCY IN SECOND TRIMESTER: ICD-10-CM

## 2020-07-31 LAB
BASOPHIL %: 0.3 % (ref 0–1.2)
BASOPHIL ABSOLUTE #: 0 10*3/UL (ref 0–0.1)
DIFFERENTIAL TYPE: ABNORMAL
EOSINOPHIL %: 2.4 % (ref 0–10)
EOSINOPHIL ABSOLUTE #: 0.1 10*3/UL (ref 0–0.5)
FERRITIN SERPL-MCNC: 77 NG/ML (ref 6–137)
GLUCOSE 1H P 50 G GLC PO SERPL-MCNC: 69 MG/DL (ref 70–130)
HEMATOCRIT: 32.1 % (ref 34–45)
HEMOGLOBIN: 10.4 G/DL (ref 11.2–15.7)
HIV1+2 AB SERPL QL IA: NEGATIVE
IRON SATN MFR SERPL: 17 % (ref 9–55)
IRON SERPL-MCNC: 86 UG/DL (ref 37–170)
LYMPH PERCENT: 19.7 % (ref 20.5–51.1)
LYMPHOCYTE ABSOLUTE #: 0.7 10*3/UL (ref 1.2–3.4)
MEAN CORPUSCULAR HGB CONCENTRATION: 32.4 % (ref 32–36)
MEAN CORPUSCULAR HGB: 25.6 PG (ref 27–34)
MEAN CORPUSCULAR VOLUME: 79.1 FL (ref 79–95)
MEAN PLATELET VOLUME: 11.3 FL (ref 8.6–12.4)
MONOCYTE ABSOLUTE #: 0.5 10*3/UL (ref 0.2–0.9)
MONOCYTE PERCENT: 15 % (ref 4.3–12.9)
NEUTROPHIL ABSOLUTE #: 2.1 10*3/UL (ref 1.4–6.5)
NEUTROPHIL PERCENT: 62.6 % (ref 34–73.5)
PLATELET COUNT: 126 10*3/UL (ref 150–400)
RED BLOOD CELL COUNT: 4.06 10*6/UL (ref 3.7–5.2)
RED CELL DISTRIBUTION WIDTH: 19.7 % (ref 11.3–14.8)
TIBC SERPL-MCNC: 496 UG/DL (ref 250–450)
WHITE BLOOD CELL COUNT: 3.4 10*3/UL (ref 4–10)

## 2020-07-31 PROCEDURE — 82728 ASSAY OF FERRITIN: CPT | Performed by: OBSTETRICS & GYNECOLOGY

## 2020-07-31 PROCEDURE — 86703 HIV-1/HIV-2 1 RESULT ANTBDY: CPT | Performed by: OBSTETRICS & GYNECOLOGY

## 2020-07-31 PROCEDURE — 85025 COMPLETE CBC W/AUTO DIFF WBC: CPT | Performed by: OBSTETRICS & GYNECOLOGY

## 2020-07-31 PROCEDURE — 36415 COLL VENOUS BLD VENIPUNCTURE: CPT | Performed by: OBSTETRICS & GYNECOLOGY

## 2020-07-31 PROCEDURE — 83550 IRON BINDING TEST: CPT | Performed by: OBSTETRICS & GYNECOLOGY

## 2020-07-31 PROCEDURE — 82950 GLUCOSE TEST: CPT | Performed by: OBSTETRICS & GYNECOLOGY

## 2020-07-31 PROCEDURE — 83540 ASSAY OF IRON: CPT | Performed by: OBSTETRICS & GYNECOLOGY

## 2020-07-31 PROCEDURE — 86592 SYPHILIS TEST NON-TREP QUAL: CPT | Performed by: OBSTETRICS & GYNECOLOGY

## 2020-08-01 ENCOUNTER — HOSPITAL (OUTPATIENT)
Dept: OTHER | Age: 35
End: 2020-08-01
Attending: INTERNAL MEDICINE

## 2020-08-02 LAB — RPR SER QL: NORMAL

## 2020-08-03 DIAGNOSIS — D69.6 PLATELETS DECREASED (CMD): Primary | ICD-10-CM

## 2020-08-04 ENCOUNTER — TELEPHONE (OUTPATIENT)
Dept: HEMATOLOGY/ONCOLOGY | Age: 35
End: 2020-08-04

## 2020-08-04 ENCOUNTER — TELEPHONE (OUTPATIENT)
Dept: OBGYN | Age: 35
End: 2020-08-04

## 2020-08-04 DIAGNOSIS — O30.002 TWIN GESTATION IN SECOND TRIMESTER, UNSPECIFIED MULTIPLE GESTATION TYPE: ICD-10-CM

## 2020-08-04 DIAGNOSIS — O99.012 ANEMIA AFFECTING PREGNANCY IN SECOND TRIMESTER: Primary | ICD-10-CM

## 2020-08-07 ENCOUNTER — TELEPHONE (OUTPATIENT)
Dept: HEMATOLOGY/ONCOLOGY | Age: 35
End: 2020-08-07

## 2020-08-10 ENCOUNTER — OFFICE VISIT (OUTPATIENT)
Dept: HEMATOLOGY/ONCOLOGY | Age: 35
End: 2020-08-10

## 2020-08-10 VITALS
TEMPERATURE: 99.3 F | DIASTOLIC BLOOD PRESSURE: 79 MMHG | RESPIRATION RATE: 18 BRPM | SYSTOLIC BLOOD PRESSURE: 125 MMHG | HEART RATE: 101 BPM

## 2020-08-10 DIAGNOSIS — O99.012 ANEMIA AFFECTING PREGNANCY IN SECOND TRIMESTER: Primary | ICD-10-CM

## 2020-08-12 ENCOUNTER — OB CHECK (OUTPATIENT)
Dept: OBGYN | Age: 35
End: 2020-08-12

## 2020-08-12 VITALS
DIASTOLIC BLOOD PRESSURE: 62 MMHG | HEART RATE: 108 BPM | BODY MASS INDEX: 21.16 KG/M2 | TEMPERATURE: 98.1 F | SYSTOLIC BLOOD PRESSURE: 112 MMHG | HEIGHT: 65 IN | OXYGEN SATURATION: 98 % | WEIGHT: 127 LBS

## 2020-08-12 DIAGNOSIS — O09.629 SUPERVISION OF HIGH-RISK PREGNANCY OF YOUNG MULTIGRAVIDA: Primary | ICD-10-CM

## 2020-08-12 DIAGNOSIS — Z23 IMMUNIZATION DUE: ICD-10-CM

## 2020-08-12 DIAGNOSIS — O30.002 TWIN GESTATION IN SECOND TRIMESTER, UNSPECIFIED MULTIPLE GESTATION TYPE: ICD-10-CM

## 2020-08-12 PROCEDURE — 90471 IMMUNIZATION ADMIN: CPT

## 2020-08-12 PROCEDURE — 0502F SUBSEQUENT PRENATAL CARE: CPT | Performed by: OBSTETRICS & GYNECOLOGY

## 2020-08-12 PROCEDURE — 90715 TDAP VACCINE 7 YRS/> IM: CPT

## 2020-08-12 ASSESSMENT — LIFESTYLE VARIABLES
DOES YOUR PARTNER HAVE A PROBLEM WITH ALCOHOL OR DRUG USE?: NO
HAVE YOU USED ANY OTHER DRUGS OR UNPRESCRIBED MEDICATIONS IN THE LAST YEAR?: NO
BEFORE YOU WERE PREGNANT DID YOU HAVE A PROBLEM WITH ALCOHOL OR DRUG USE? (PAST): NO
DID ANY OF YOUR PARENTS HAVE PROBLEMS WITH ALCOHOL OR DRUG USE?: YES
DO ANY OF YOUR FRIENDS (PEERS) HAVE PROBLEMS WITH ALCOHOL OR DRUG USE?: YES
HAVE YOU USED OPIOIDS, NARCOTICS OR PAIN MEDICATION IN THE LAST YEAR?: NO
IN THE PAST MONTH, DID YOU DRINK BEER, WINE OR LIQUOR, OR USE OTHER DRUGS? (PREGNANCY): NO

## 2020-08-17 ENCOUNTER — APPOINTMENT (OUTPATIENT)
Dept: FAMILY MEDICINE | Age: 35
End: 2020-08-17

## 2020-08-18 ENCOUNTER — TELEPHONE (OUTPATIENT)
Dept: HEMATOLOGY/ONCOLOGY | Age: 35
End: 2020-08-18

## 2020-08-19 ENCOUNTER — OFFICE VISIT (OUTPATIENT)
Dept: HEMATOLOGY/ONCOLOGY | Age: 35
End: 2020-08-19

## 2020-08-19 DIAGNOSIS — O99.012 ANEMIA AFFECTING PREGNANCY IN SECOND TRIMESTER: Primary | ICD-10-CM

## 2020-08-19 ASSESSMENT — PAIN SCALES - GENERAL: PAINLEVEL: 0

## 2020-08-21 ENCOUNTER — TELEPHONE (OUTPATIENT)
Dept: OBGYN | Age: 35
End: 2020-08-21

## 2020-08-21 DIAGNOSIS — O30.002 TWIN GESTATION IN SECOND TRIMESTER, UNSPECIFIED MULTIPLE GESTATION TYPE: Primary | ICD-10-CM

## 2020-08-25 ENCOUNTER — IMAGING SERVICES (OUTPATIENT)
Dept: ULTRASOUND IMAGING | Age: 35
End: 2020-08-25
Attending: OBSTETRICS & GYNECOLOGY

## 2020-08-25 DIAGNOSIS — O30.002 TWIN GESTATION IN SECOND TRIMESTER, UNSPECIFIED MULTIPLE GESTATION TYPE: ICD-10-CM

## 2020-08-25 PROCEDURE — 76815 OB US LIMITED FETUS(S): CPT | Performed by: RADIOLOGY

## 2020-08-26 ENCOUNTER — TELEPHONE (OUTPATIENT)
Dept: MATERNAL FETAL MEDICINE | Age: 35
End: 2020-08-26

## 2020-08-26 ENCOUNTER — OB CHECK (OUTPATIENT)
Dept: OBGYN | Age: 35
End: 2020-08-26

## 2020-08-26 ENCOUNTER — TELEPHONE (OUTPATIENT)
Dept: OBGYN | Age: 35
End: 2020-08-26

## 2020-08-26 VITALS
WEIGHT: 128.6 LBS | SYSTOLIC BLOOD PRESSURE: 118 MMHG | DIASTOLIC BLOOD PRESSURE: 74 MMHG | BODY MASS INDEX: 21.4 KG/M2 | TEMPERATURE: 98 F

## 2020-08-26 DIAGNOSIS — O09.629 SUPERVISION OF HIGH-RISK PREGNANCY OF YOUNG MULTIGRAVIDA: ICD-10-CM

## 2020-08-26 DIAGNOSIS — O30.043 DICHORIONIC DIAMNIOTIC TWIN PREGNANCY IN THIRD TRIMESTER: Primary | ICD-10-CM

## 2020-08-26 DIAGNOSIS — O99.019 ANEMIA OF MOTHER IN PREGNANCY, ANTEPARTUM: Primary | ICD-10-CM

## 2020-08-26 PROCEDURE — 59025 FETAL NON-STRESS TEST: CPT | Performed by: OBSTETRICS & GYNECOLOGY

## 2020-08-26 PROCEDURE — 0502F SUBSEQUENT PRENATAL CARE: CPT | Performed by: OBSTETRICS & GYNECOLOGY

## 2020-08-27 ENCOUNTER — OFFICE VISIT (OUTPATIENT)
Dept: HEMATOLOGY/ONCOLOGY | Age: 35
End: 2020-08-27

## 2020-08-27 ENCOUNTER — EXTERNAL RECORD (OUTPATIENT)
Dept: HEALTH INFORMATION MANAGEMENT | Facility: OTHER | Age: 35
End: 2020-08-27

## 2020-08-27 ENCOUNTER — OFFICE VISIT (OUTPATIENT)
Dept: MATERNAL FETAL MEDICINE | Age: 35
End: 2020-08-27
Attending: OBSTETRICS & GYNECOLOGY

## 2020-08-27 VITALS
SYSTOLIC BLOOD PRESSURE: 122 MMHG | HEART RATE: 102 BPM | RESPIRATION RATE: 16 BRPM | DIASTOLIC BLOOD PRESSURE: 76 MMHG | TEMPERATURE: 99.4 F

## 2020-08-27 DIAGNOSIS — E07.9 ASYMMETRICAL THYROID: ICD-10-CM

## 2020-08-27 DIAGNOSIS — J45.991 COUGH VARIANT ASTHMA: ICD-10-CM

## 2020-08-27 DIAGNOSIS — O99.012 ANEMIA AFFECTING PREGNANCY IN SECOND TRIMESTER: Primary | ICD-10-CM

## 2020-08-27 DIAGNOSIS — O30.049 DICHORIONIC DIAMNIOTIC TWIN PREGNANCY, ANTEPARTUM: Primary | ICD-10-CM

## 2020-08-27 DIAGNOSIS — O09.299 HX OF PRE-ECLAMPSIA IN PRIOR PREGNANCY, CURRENTLY PREGNANT: ICD-10-CM

## 2020-08-27 DIAGNOSIS — Z86.2 HISTORY OF ANEMIA: ICD-10-CM

## 2020-08-27 DIAGNOSIS — Z98.891 HISTORY OF LOW TRANSVERSE CESAREAN SECTION: ICD-10-CM

## 2020-08-27 DIAGNOSIS — R01.1 HEART MURMUR: ICD-10-CM

## 2020-08-27 DIAGNOSIS — D61.818 PANCYTOPENIA (CMD): ICD-10-CM

## 2020-08-27 DIAGNOSIS — R76.0 LUPUS ANTICOAGULANT POSITIVE: ICD-10-CM

## 2020-08-27 PROCEDURE — 76816 OB US FOLLOW-UP PER FETUS: CPT | Performed by: OBSTETRICS & GYNECOLOGY

## 2020-09-01 ENCOUNTER — HOSPITAL (OUTPATIENT)
Dept: OTHER | Age: 35
End: 2020-09-01
Attending: INTERNAL MEDICINE

## 2020-09-01 ENCOUNTER — OFFICE VISIT (OUTPATIENT)
Dept: HEMATOLOGY/ONCOLOGY | Age: 35
End: 2020-09-01

## 2020-09-01 ENCOUNTER — OB CHECK (OUTPATIENT)
Dept: OBGYN | Age: 35
End: 2020-09-01

## 2020-09-01 ENCOUNTER — IMAGING SERVICES (OUTPATIENT)
Dept: ULTRASOUND IMAGING | Age: 35
End: 2020-09-01
Attending: OBSTETRICS & GYNECOLOGY

## 2020-09-01 VITALS
RESPIRATION RATE: 20 BRPM | WEIGHT: 130.51 LBS | SYSTOLIC BLOOD PRESSURE: 112 MMHG | BODY MASS INDEX: 21.72 KG/M2 | HEART RATE: 80 BPM | TEMPERATURE: 97.7 F | DIASTOLIC BLOOD PRESSURE: 60 MMHG

## 2020-09-01 DIAGNOSIS — O30.043 DICHORIONIC DIAMNIOTIC TWIN PREGNANCY IN THIRD TRIMESTER: Primary | ICD-10-CM

## 2020-09-01 DIAGNOSIS — O30.002 TWIN GESTATION IN SECOND TRIMESTER, UNSPECIFIED MULTIPLE GESTATION TYPE: ICD-10-CM

## 2020-09-01 DIAGNOSIS — O99.012 ANEMIA AFFECTING PREGNANCY IN SECOND TRIMESTER: Primary | ICD-10-CM

## 2020-09-01 PROCEDURE — 76815 OB US LIMITED FETUS(S): CPT | Performed by: RADIOLOGY

## 2020-09-01 PROCEDURE — 59025 FETAL NON-STRESS TEST: CPT

## 2020-09-01 ASSESSMENT — PAIN SCALES - GENERAL: PAINLEVEL: 0

## 2020-09-08 ENCOUNTER — IMAGING SERVICES (OUTPATIENT)
Dept: ULTRASOUND IMAGING | Age: 35
End: 2020-09-08
Attending: OBSTETRICS & GYNECOLOGY

## 2020-09-08 ENCOUNTER — LAB SERVICES (OUTPATIENT)
Dept: LAB | Age: 35
End: 2020-09-08

## 2020-09-08 DIAGNOSIS — D69.6 PLATELETS DECREASED (CMD): ICD-10-CM

## 2020-09-08 DIAGNOSIS — D69.6 PLATELETS DECREASED (CMD): Primary | ICD-10-CM

## 2020-09-08 DIAGNOSIS — O30.002 TWIN GESTATION IN SECOND TRIMESTER, UNSPECIFIED MULTIPLE GESTATION TYPE: ICD-10-CM

## 2020-09-08 DIAGNOSIS — O99.019 ANEMIA OF MOTHER IN PREGNANCY, ANTEPARTUM: ICD-10-CM

## 2020-09-08 LAB
BASOPHIL %: 0.3 % (ref 0–1.2)
BASOPHIL ABSOLUTE #: 0 10*3/UL (ref 0–0.1)
DIFFERENTIAL TYPE: ABNORMAL
EOSINOPHIL %: 1.9 % (ref 0–10)
EOSINOPHIL ABSOLUTE #: 0.1 10*3/UL (ref 0–0.5)
FERRITIN SERPL-MCNC: 121 NG/ML (ref 6–137)
HEMATOCRIT: 32.6 % (ref 34–45)
HEMOGLOBIN: 10.5 G/DL (ref 11.2–15.7)
IMMATURE GRANULOCYTE ABSOLUTE: 0.01 10*3/UL (ref 0–0.05)
IMMATURE GRANULOCYTE PERCENT: 0.3 % (ref 0–0.5)
IMMATURE PLATELET FRACTION ABSOLUTE: 6.8 10*3/UL
IMMATURE PLATELET FRACTION PERCENT: 6.3 % (ref 0.9–11.2)
IRON SATN MFR SERPL: 20 % (ref 9–55)
IRON SERPL-MCNC: 93 UG/DL (ref 37–170)
LYMPH PERCENT: 20.7 % (ref 20.5–51.1)
LYMPHOCYTE ABSOLUTE #: 0.8 10*3/UL (ref 1.2–3.4)
MEAN CORPUSCULAR HGB CONCENTRATION: 32.2 % (ref 32–36)
MEAN CORPUSCULAR HGB: 25.9 PG (ref 27–34)
MEAN CORPUSCULAR VOLUME: 80.3 FL (ref 79–95)
MEAN PLATELET VOLUME: 11.2 FL (ref 8.6–12.4)
MONOCYTE ABSOLUTE #: 0.6 10*3/UL (ref 0.2–0.9)
MONOCYTE PERCENT: 16.4 % (ref 4.3–12.9)
NEUTROPHIL ABSOLUTE #: 2.3 10*3/UL (ref 1.4–6.5)
NEUTROPHIL PERCENT: 60.4 % (ref 34–73.5)
PLATELET COUNT: 108 10*3/UL (ref 150–400)
RED BLOOD CELL COUNT: 4.06 10*6/UL (ref 3.7–5.2)
RED CELL DISTRIBUTION WIDTH: 18.5 % (ref 11.3–14.8)
TIBC SERPL-MCNC: 473 UG/DL (ref 250–450)
WHITE BLOOD CELL COUNT: 3.7 10*3/UL (ref 4–10)

## 2020-09-08 PROCEDURE — 83550 IRON BINDING TEST: CPT | Performed by: OBSTETRICS & GYNECOLOGY

## 2020-09-08 PROCEDURE — 36415 COLL VENOUS BLD VENIPUNCTURE: CPT | Performed by: OBSTETRICS & GYNECOLOGY

## 2020-09-08 PROCEDURE — 76815 OB US LIMITED FETUS(S): CPT | Performed by: RADIOLOGY

## 2020-09-08 PROCEDURE — 82728 ASSAY OF FERRITIN: CPT | Performed by: OBSTETRICS & GYNECOLOGY

## 2020-09-08 PROCEDURE — 85025 COMPLETE CBC W/AUTO DIFF WBC: CPT | Performed by: OBSTETRICS & GYNECOLOGY

## 2020-09-08 PROCEDURE — 83540 ASSAY OF IRON: CPT | Performed by: OBSTETRICS & GYNECOLOGY

## 2020-09-09 ENCOUNTER — OB CHECK (OUTPATIENT)
Dept: OBGYN | Age: 35
End: 2020-09-09

## 2020-09-09 ENCOUNTER — TELEPHONE (OUTPATIENT)
Dept: OBGYN | Age: 35
End: 2020-09-09

## 2020-09-09 VITALS
OXYGEN SATURATION: 99 % | BODY MASS INDEX: 22.13 KG/M2 | TEMPERATURE: 98.4 F | WEIGHT: 133 LBS | HEART RATE: 92 BPM | SYSTOLIC BLOOD PRESSURE: 120 MMHG | DIASTOLIC BLOOD PRESSURE: 78 MMHG

## 2020-09-09 DIAGNOSIS — Z67.40 TYPE O BLOOD, RH POSITIVE: ICD-10-CM

## 2020-09-09 DIAGNOSIS — Z86.2 HISTORY OF ANEMIA: ICD-10-CM

## 2020-09-09 DIAGNOSIS — O09.629 SUPERVISION OF HIGH-RISK PREGNANCY OF YOUNG MULTIGRAVIDA: Primary | ICD-10-CM

## 2020-09-09 DIAGNOSIS — Z98.891 HISTORY OF LOW TRANSVERSE CESAREAN SECTION: ICD-10-CM

## 2020-09-09 DIAGNOSIS — O30.049 DICHORIONIC DIAMNIOTIC TWIN PREGNANCY, ANTEPARTUM: ICD-10-CM

## 2020-09-09 DIAGNOSIS — O09.299 HX OF PRE-ECLAMPSIA IN PRIOR PREGNANCY, CURRENTLY PREGNANT: ICD-10-CM

## 2020-09-09 DIAGNOSIS — O30.043 DICHORIONIC DIAMNIOTIC TWIN PREGNANCY IN THIRD TRIMESTER: Primary | ICD-10-CM

## 2020-09-09 PROBLEM — Z30.8 ENCOUNTER FOR TUBAL LIGATION COUNSELING: Status: ACTIVE | Noted: 2020-09-09

## 2020-09-09 PROCEDURE — 59025 FETAL NON-STRESS TEST: CPT

## 2020-09-09 PROCEDURE — 0502F SUBSEQUENT PRENATAL CARE: CPT | Performed by: OBSTETRICS & GYNECOLOGY

## 2020-09-15 ENCOUNTER — OB CHECK (OUTPATIENT)
Dept: OBGYN | Age: 35
End: 2020-09-15

## 2020-09-15 ENCOUNTER — LAB SERVICES (OUTPATIENT)
Dept: LAB | Age: 35
End: 2020-09-15

## 2020-09-15 ENCOUNTER — IMAGING SERVICES (OUTPATIENT)
Dept: ULTRASOUND IMAGING | Age: 35
End: 2020-09-15
Attending: OBSTETRICS & GYNECOLOGY

## 2020-09-15 VITALS
WEIGHT: 132.6 LBS | TEMPERATURE: 98.6 F | BODY MASS INDEX: 22.07 KG/M2 | RESPIRATION RATE: 18 BRPM | SYSTOLIC BLOOD PRESSURE: 114 MMHG | OXYGEN SATURATION: 100 % | HEART RATE: 89 BPM | DIASTOLIC BLOOD PRESSURE: 68 MMHG

## 2020-09-15 VITALS
BODY MASS INDEX: 21.97 KG/M2 | HEART RATE: 80 BPM | WEIGHT: 132 LBS | SYSTOLIC BLOOD PRESSURE: 125 MMHG | RESPIRATION RATE: 18 BRPM | DIASTOLIC BLOOD PRESSURE: 70 MMHG | TEMPERATURE: 98.8 F

## 2020-09-15 DIAGNOSIS — O30.002 TWIN GESTATION IN SECOND TRIMESTER, UNSPECIFIED MULTIPLE GESTATION TYPE: ICD-10-CM

## 2020-09-15 DIAGNOSIS — O30.049 DICHORIONIC DIAMNIOTIC TWIN PREGNANCY, ANTEPARTUM: ICD-10-CM

## 2020-09-15 DIAGNOSIS — Z36.85 SCREENING, ANTENATAL, FOR STREPTOCOCCUS B: ICD-10-CM

## 2020-09-15 DIAGNOSIS — O09.629 SUPERVISION OF HIGH-RISK PREGNANCY OF YOUNG MULTIGRAVIDA: Primary | ICD-10-CM

## 2020-09-15 DIAGNOSIS — D69.6 PLATELETS DECREASED (CMD): ICD-10-CM

## 2020-09-15 LAB
BASOPHIL %: 0.2 % (ref 0–1.2)
BASOPHIL ABSOLUTE #: 0 10*3/UL (ref 0–0.1)
DIFFERENTIAL TYPE: ABNORMAL
EOSINOPHIL %: 1.7 % (ref 0–10)
EOSINOPHIL ABSOLUTE #: 0.1 10*3/UL (ref 0–0.5)
HEMATOCRIT: 35.7 % (ref 34–45)
HEMOGLOBIN: 11.3 G/DL (ref 11.2–15.7)
IMMATURE GRANULOCYTE ABSOLUTE: 0.02 10*3/UL (ref 0–0.05)
IMMATURE GRANULOCYTE PERCENT: 0.4 % (ref 0–0.5)
IMMATURE PLATELET FRACTION ABSOLUTE: 8.5 10*3/UL
IMMATURE PLATELET FRACTION PERCENT: 7.5 % (ref 0.9–11.2)
LYMPH PERCENT: 18 % (ref 20.5–51.1)
LYMPHOCYTE ABSOLUTE #: 1 10*3/UL (ref 1.2–3.4)
MEAN CORPUSCULAR HGB CONCENTRATION: 31.7 % (ref 32–36)
MEAN CORPUSCULAR HGB: 25.9 PG (ref 27–34)
MEAN CORPUSCULAR VOLUME: 81.9 FL (ref 79–95)
MEAN PLATELET VOLUME: 10.6 FL (ref 8.6–12.4)
MONOCYTE ABSOLUTE #: 0.8 10*3/UL (ref 0.2–0.9)
MONOCYTE PERCENT: 14.3 % (ref 4.3–12.9)
NEUTROPHIL ABSOLUTE #: 3.5 10*3/UL (ref 1.4–6.5)
NEUTROPHIL PERCENT: 65.4 % (ref 34–73.5)
PLATELET COUNT: 114 10*3/UL (ref 150–400)
RED BLOOD CELL COUNT: 4.36 10*6/UL (ref 3.7–5.2)
RED CELL DISTRIBUTION WIDTH: 18.4 % (ref 11.3–14.8)
WHITE BLOOD CELL COUNT: 5.4 10*3/UL (ref 4–10)

## 2020-09-15 PROCEDURE — 76815 OB US LIMITED FETUS(S): CPT | Performed by: RADIOLOGY

## 2020-09-15 PROCEDURE — 59025 FETAL NON-STRESS TEST: CPT

## 2020-09-15 PROCEDURE — 36415 COLL VENOUS BLD VENIPUNCTURE: CPT | Performed by: OBSTETRICS & GYNECOLOGY

## 2020-09-15 PROCEDURE — 87653 STREP B DNA AMP PROBE: CPT | Performed by: PATHOLOGY

## 2020-09-15 PROCEDURE — 87081 CULTURE SCREEN ONLY: CPT | Performed by: PATHOLOGY

## 2020-09-15 PROCEDURE — 85025 COMPLETE CBC W/AUTO DIFF WBC: CPT | Performed by: OBSTETRICS & GYNECOLOGY

## 2020-09-15 PROCEDURE — 0502F SUBSEQUENT PRENATAL CARE: CPT | Performed by: OBSTETRICS & GYNECOLOGY

## 2020-09-15 RX ORDER — ENOXAPARIN SODIUM 100 MG/ML
INJECTION SUBCUTANEOUS
COMMUNITY
End: 2021-03-15 | Stop reason: ALTCHOICE

## 2020-09-16 LAB — FINAL REPORT: NORMAL

## 2020-09-21 ENCOUNTER — TELEPHONE (OUTPATIENT)
Dept: OBGYN | Age: 35
End: 2020-09-21

## 2020-09-23 ENCOUNTER — APPOINTMENT (OUTPATIENT)
Dept: OBGYN | Age: 35
End: 2020-09-23

## 2020-09-23 ENCOUNTER — TELEPHONE (OUTPATIENT)
Dept: MATERNAL FETAL MEDICINE | Age: 35
End: 2020-09-23

## 2020-09-24 ENCOUNTER — LAB SERVICES (OUTPATIENT)
Dept: LAB | Age: 35
End: 2020-09-24

## 2020-09-24 ENCOUNTER — OFFICE VISIT (OUTPATIENT)
Dept: MATERNAL FETAL MEDICINE | Age: 35
End: 2020-09-24
Attending: OBSTETRICS & GYNECOLOGY

## 2020-09-24 ENCOUNTER — APPOINTMENT (OUTPATIENT)
Dept: MATERNAL FETAL MEDICINE | Age: 35
End: 2020-09-24

## 2020-09-24 DIAGNOSIS — Z87.59 HISTORY OF PRE-ECLAMPSIA: ICD-10-CM

## 2020-09-24 DIAGNOSIS — Z87.59 HISTORY OF SEVERE PRE-ECLAMPSIA: ICD-10-CM

## 2020-09-24 DIAGNOSIS — D69.6 PLATELETS DECREASED (CMD): ICD-10-CM

## 2020-09-24 DIAGNOSIS — D68.59 PROTEIN S DEFICIENCY (CMD): ICD-10-CM

## 2020-09-24 DIAGNOSIS — O30.049 TWIN PREGNANCY, TWINS DICHORIONIC AND DIAMNIOTIC: ICD-10-CM

## 2020-09-24 DIAGNOSIS — O30.049 DICHORIONIC DIAMNIOTIC TWIN PREGNANCY, ANTEPARTUM: Primary | ICD-10-CM

## 2020-09-24 DIAGNOSIS — Z98.891 HISTORY OF CESAREAN SECTION: ICD-10-CM

## 2020-09-24 DIAGNOSIS — J45.909 ASTHMA: ICD-10-CM

## 2020-09-24 LAB
BASOPHIL %: 0.2 % (ref 0–1.2)
BASOPHIL ABSOLUTE #: 0 10*3/UL (ref 0–0.1)
DIFFERENTIAL TYPE: ABNORMAL
EOSINOPHIL %: 1.4 % (ref 0–10)
EOSINOPHIL ABSOLUTE #: 0.1 10*3/UL (ref 0–0.5)
HEMATOCRIT: 35.1 % (ref 34–45)
HEMOGLOBIN: 11.4 G/DL (ref 11.2–15.7)
IMMATURE GRANULOCYTE ABSOLUTE: 0.02 10*3/UL (ref 0–0.05)
IMMATURE GRANULOCYTE PERCENT: 0.4 % (ref 0–0.5)
LYMPH PERCENT: 19.3 % (ref 20.5–51.1)
LYMPHOCYTE ABSOLUTE #: 1 10*3/UL (ref 1.2–3.4)
MEAN CORPUSCULAR HGB CONCENTRATION: 32.5 % (ref 32–36)
MEAN CORPUSCULAR HGB: 27.1 PG (ref 27–34)
MEAN CORPUSCULAR VOLUME: 83.4 FL (ref 79–95)
MEAN PLATELET VOLUME: 12.1 FL (ref 8.6–12.4)
MONOCYTE ABSOLUTE #: 0.7 10*3/UL (ref 0.2–0.9)
MONOCYTE PERCENT: 14.3 % (ref 4.3–12.9)
NEUTROPHIL ABSOLUTE #: 3.2 10*3/UL (ref 1.4–6.5)
NEUTROPHIL PERCENT: 64.4 % (ref 34–73.5)
PLATELET COUNT: 111 10*3/UL (ref 150–400)
RED BLOOD CELL COUNT: 4.21 10*6/UL (ref 3.7–5.2)
RED CELL DISTRIBUTION WIDTH: 17.8 % (ref 11.3–14.8)
WHITE BLOOD CELL COUNT: 5 10*3/UL (ref 4–10)

## 2020-09-24 PROCEDURE — 76816 OB US FOLLOW-UP PER FETUS: CPT | Performed by: OBSTETRICS & GYNECOLOGY

## 2020-09-24 PROCEDURE — 76817 TRANSVAGINAL US OBSTETRIC: CPT | Performed by: OBSTETRICS & GYNECOLOGY

## 2020-09-24 PROCEDURE — 36415 COLL VENOUS BLD VENIPUNCTURE: CPT | Performed by: OBSTETRICS & GYNECOLOGY

## 2020-09-24 PROCEDURE — 85025 COMPLETE CBC W/AUTO DIFF WBC: CPT | Performed by: OBSTETRICS & GYNECOLOGY

## 2020-09-25 ENCOUNTER — OB CHECK (OUTPATIENT)
Dept: OBGYN | Age: 35
End: 2020-09-25

## 2020-09-25 ENCOUNTER — IMAGING SERVICES (OUTPATIENT)
Dept: ULTRASOUND IMAGING | Age: 35
End: 2020-09-25
Attending: OBSTETRICS & GYNECOLOGY

## 2020-09-25 ENCOUNTER — OFF PREMISE (OUTPATIENT)
Dept: HEALTH INFORMATION MANAGEMENT | Facility: OTHER | Age: 35
End: 2020-09-25

## 2020-09-25 ENCOUNTER — EXTERNAL RECORD (OUTPATIENT)
Dept: HEALTH INFORMATION MANAGEMENT | Facility: OTHER | Age: 35
End: 2020-09-25

## 2020-09-25 DIAGNOSIS — Z67.40 TYPE O BLOOD, RH POSITIVE: ICD-10-CM

## 2020-09-25 DIAGNOSIS — O30.049 DICHORIONIC DIAMNIOTIC TWIN PREGNANCY, ANTEPARTUM: Primary | ICD-10-CM

## 2020-09-25 DIAGNOSIS — Z86.2 HISTORY OF ANEMIA: ICD-10-CM

## 2020-09-25 DIAGNOSIS — O30.002 TWIN GESTATION IN SECOND TRIMESTER, UNSPECIFIED MULTIPLE GESTATION TYPE: ICD-10-CM

## 2020-09-25 DIAGNOSIS — O09.299 HX OF PRE-ECLAMPSIA IN PRIOR PREGNANCY, CURRENTLY PREGNANT: ICD-10-CM

## 2020-09-25 DIAGNOSIS — Z30.8 ENCOUNTER FOR TUBAL LIGATION COUNSELING: ICD-10-CM

## 2020-09-25 DIAGNOSIS — Z98.891 HISTORY OF LOW TRANSVERSE CESAREAN SECTION: ICD-10-CM

## 2020-09-25 PROCEDURE — 59899 UNLISTED PX MAT CARE&DLVR: CPT | Performed by: OBSTETRICS & GYNECOLOGY

## 2020-09-25 PROCEDURE — 58611 LIGATE OVIDUCT(S) ADD-ON: CPT | Performed by: OBSTETRICS & GYNECOLOGY

## 2020-09-25 PROCEDURE — 59510 CESAREAN DELIVERY: CPT | Performed by: OBSTETRICS & GYNECOLOGY

## 2020-09-25 PROCEDURE — 76815 OB US LIMITED FETUS(S): CPT | Performed by: RADIOLOGY

## 2020-09-25 PROCEDURE — 59025 FETAL NON-STRESS TEST: CPT

## 2020-09-25 PROCEDURE — 59514 CESAREAN DELIVERY ONLY: CPT | Performed by: OBSTETRICS & GYNECOLOGY

## 2020-09-29 ENCOUNTER — TELEPHONE (OUTPATIENT)
Dept: SCHEDULING | Age: 35
End: 2020-09-29

## 2020-09-30 ENCOUNTER — APPOINTMENT (OUTPATIENT)
Dept: OBGYN | Age: 35
End: 2020-09-30

## 2020-10-01 ENCOUNTER — OFFICE VISIT (OUTPATIENT)
Dept: OBGYN | Age: 35
End: 2020-10-01

## 2020-10-01 ENCOUNTER — APPOINTMENT (OUTPATIENT)
Dept: OBGYN | Age: 35
End: 2020-10-01

## 2020-10-01 DIAGNOSIS — I16.0 HYPERTENSIVE URGENCY: ICD-10-CM

## 2020-10-01 PROCEDURE — 99214 OFFICE O/P EST MOD 30 MIN: CPT | Performed by: OBSTETRICS & GYNECOLOGY

## 2020-10-01 PROCEDURE — 99219 INITIAL OBSERVATION CARE,LEVL II: CPT | Performed by: OBSTETRICS & GYNECOLOGY

## 2020-10-01 PROCEDURE — 3077F SYST BP >= 140 MM HG: CPT | Performed by: OBSTETRICS & GYNECOLOGY

## 2020-10-01 PROCEDURE — 3080F DIAST BP >= 90 MM HG: CPT | Performed by: OBSTETRICS & GYNECOLOGY

## 2020-10-01 RX ORDER — LABETALOL 200 MG/1
200 TABLET, FILM COATED ORAL 2 TIMES DAILY
Qty: 60 TABLET | Refills: 0 | Status: SHIPPED | OUTPATIENT
Start: 2020-10-01 | End: 2021-03-15 | Stop reason: ALTCHOICE

## 2020-10-01 RX ORDER — HYDROCODONE BITARTRATE AND ACETAMINOPHEN 5; 325 MG/1; MG/1
TABLET ORAL
COMMUNITY
Start: 2020-09-27 | End: 2021-03-15 | Stop reason: ALTCHOICE

## 2020-10-01 RX ORDER — BLOOD PRESSURE TEST KIT
KIT MISCELLANEOUS
Qty: 1 KIT | Refills: 0 | Status: SHIPPED | OUTPATIENT
Start: 2020-10-01

## 2020-10-01 ASSESSMENT — ENCOUNTER SYMPTOMS
ABDOMINAL DISTENTION: 0
DIZZINESS: 0
SLEEP DISTURBANCE: 0
CHEST TIGHTNESS: 0
CONFUSION: 0
SHORTNESS OF BREATH: 0
COLOR CHANGE: 0
NERVOUS/ANXIOUS: 0
BRUISES/BLEEDS EASILY: 0
HEADACHES: 1
ACTIVITY CHANGE: 0
PHOTOPHOBIA: 0
ABDOMINAL PAIN: 0
APPETITE CHANGE: 0
CONSTIPATION: 0
FATIGUE: 1
COUGH: 0
UNEXPECTED WEIGHT CHANGE: 0

## 2020-10-01 ASSESSMENT — PAIN SCALES - GENERAL: PAINLEVEL: 0

## 2020-10-01 ASSESSMENT — VISUAL ACUITY: OU: 1

## 2020-10-02 ENCOUNTER — TELEPHONE (OUTPATIENT)
Dept: OBGYN | Age: 35
End: 2020-10-02

## 2020-10-02 RX ORDER — LABETALOL 200 MG/1
TABLET, FILM COATED ORAL
Qty: 180 TABLET | OUTPATIENT
Start: 2020-10-02

## 2020-10-05 ENCOUNTER — OFFICE VISIT (OUTPATIENT)
Dept: OBGYN | Age: 35
End: 2020-10-05

## 2020-10-05 VITALS
RESPIRATION RATE: 16 BRPM | WEIGHT: 113.6 LBS | HEART RATE: 80 BPM | DIASTOLIC BLOOD PRESSURE: 64 MMHG | SYSTOLIC BLOOD PRESSURE: 110 MMHG | BODY MASS INDEX: 18.9 KG/M2 | TEMPERATURE: 97.7 F | OXYGEN SATURATION: 97 %

## 2020-10-05 DIAGNOSIS — I10 HYPERTENSION, UNSPECIFIED TYPE: Primary | ICD-10-CM

## 2020-10-05 PROCEDURE — 3074F SYST BP LT 130 MM HG: CPT | Performed by: OBSTETRICS & GYNECOLOGY

## 2020-10-05 PROCEDURE — 3078F DIAST BP <80 MM HG: CPT | Performed by: OBSTETRICS & GYNECOLOGY

## 2020-10-05 PROCEDURE — 99213 OFFICE O/P EST LOW 20 MIN: CPT | Performed by: OBSTETRICS & GYNECOLOGY

## 2020-10-05 ASSESSMENT — ENCOUNTER SYMPTOMS
SHORTNESS OF BREATH: 0
ACTIVITY CHANGE: 0
COUGH: 0
SLEEP DISTURBANCE: 0
ABDOMINAL PAIN: 0
FATIGUE: 0
UNEXPECTED WEIGHT CHANGE: 0
DIZZINESS: 0
ABDOMINAL DISTENTION: 0
HEADACHES: 0
APPETITE CHANGE: 0
COLOR CHANGE: 0
NERVOUS/ANXIOUS: 0
CONFUSION: 0
PHOTOPHOBIA: 0
CONSTIPATION: 0
BRUISES/BLEEDS EASILY: 0
CHEST TIGHTNESS: 0

## 2020-10-05 ASSESSMENT — VISUAL ACUITY: OU: 1

## 2020-10-07 ENCOUNTER — APPOINTMENT (OUTPATIENT)
Dept: OBGYN | Age: 35
End: 2020-10-07

## 2020-10-09 ENCOUNTER — TELEPHONE (OUTPATIENT)
Dept: CARDIOLOGY | Age: 35
End: 2020-10-09

## 2020-11-10 ENCOUNTER — POSTPARTUM VISIT (OUTPATIENT)
Dept: OBGYN | Age: 35
End: 2020-11-10

## 2020-11-10 VITALS
HEIGHT: 62 IN | BODY MASS INDEX: 21.53 KG/M2 | HEART RATE: 80 BPM | SYSTOLIC BLOOD PRESSURE: 124 MMHG | WEIGHT: 117 LBS | DIASTOLIC BLOOD PRESSURE: 82 MMHG

## 2020-11-10 DIAGNOSIS — F41.8 POSTPARTUM ANXIETY: Primary | ICD-10-CM

## 2020-11-10 PROBLEM — O09.299 HX OF PRE-ECLAMPSIA IN PRIOR PREGNANCY, CURRENTLY PREGNANT: Status: RESOLVED | Noted: 2020-03-05 | Resolved: 2020-11-10

## 2020-11-10 PROBLEM — Z30.8 ENCOUNTER FOR TUBAL LIGATION COUNSELING: Status: RESOLVED | Noted: 2020-09-09 | Resolved: 2020-11-10

## 2020-11-10 PROBLEM — Z98.891 HISTORY OF LOW TRANSVERSE CESAREAN SECTION: Status: RESOLVED | Noted: 2020-03-05 | Resolved: 2020-11-10

## 2020-11-10 PROBLEM — O30.049 DICHORIONIC DIAMNIOTIC TWIN PREGNANCY, ANTEPARTUM: Status: RESOLVED | Noted: 2020-03-05 | Resolved: 2020-11-10

## 2020-11-10 PROCEDURE — 0503F POSTPARTUM CARE VISIT: CPT | Performed by: OBSTETRICS & GYNECOLOGY

## 2020-11-10 PROCEDURE — 96127 BRIEF EMOTIONAL/BEHAV ASSMT: CPT | Performed by: OBSTETRICS & GYNECOLOGY

## 2020-11-10 ASSESSMENT — EDINBURGH POSTNATAL DEPRESSION SCALE (EPDS)
I HAVE FELT SCARED OR PANICKY FOR NO GOOD REASON: YES, SOMETIMES
I HAVE BEEN SO UNHAPPY THAT I HAVE BEEN CRYING: NO, NEVER
THE THOUGHT OF HARMING MYSELF HAS OCCURRED TO ME: NEVER
I HAVE BEEN SO UNHAPPY THAT I HAVE HAD DIFFICULTY SLEEPING: NOT AT ALL
I HAVE FELT SAD OR MISERABLE: NO, NOT AT ALL
TOTAL SCORE: 6
I HAVE BEEN ABLE TO LAUGH AND SEE THE FUNNY SIDE OF THINGS: AS MUCH AS I ALWAYS COULD
I HAVE BEEN ANXIOUS OR WORRIED FOR NO GOOD REASON: YES, SOMETIMES
THINGS HAVE BEEN GETTING ON TOP OF ME: NO, MOST OF THE TIME I HAVE COPED QUITE WELL
I HAVE LOOKED FORWARD WITH ENJOYMENT TO THINGS: AS MUCH AS I EVER DID
I HAVE BLAMED MYSELF UNNECESSARILY WHEN THINGS WENT WRONG: NOT VERY OFTEN

## 2020-11-10 ASSESSMENT — ENCOUNTER SYMPTOMS
ABDOMINAL PAIN: 0
NERVOUS/ANXIOUS: 1
APPETITE CHANGE: 0
CHEST TIGHTNESS: 0
SLEEP DISTURBANCE: 0
DIZZINESS: 0
ABDOMINAL DISTENTION: 0
HEADACHES: 0
BRUISES/BLEEDS EASILY: 0
COLOR CHANGE: 0
UNEXPECTED WEIGHT CHANGE: 0
ACTIVITY CHANGE: 0
PHOTOPHOBIA: 0
CONFUSION: 0
CONSTIPATION: 0
COUGH: 0
SHORTNESS OF BREATH: 0

## 2020-11-10 ASSESSMENT — VISUAL ACUITY: OU: 1

## 2020-11-11 ENCOUNTER — E-ADVICE (OUTPATIENT)
Dept: BEHAVIORAL HEALTH | Age: 35
End: 2020-11-11

## 2020-12-04 ENCOUNTER — TELEPHONE (OUTPATIENT)
Dept: BEHAVIORAL HEALTH | Age: 35
End: 2020-12-04

## 2020-12-07 ENCOUNTER — V-VISIT (OUTPATIENT)
Dept: BEHAVIORAL HEALTH | Age: 35
End: 2020-12-07

## 2020-12-07 PROCEDURE — 90791 PSYCH DIAGNOSTIC EVALUATION: CPT | Performed by: SOCIAL WORKER

## 2020-12-14 ENCOUNTER — V-VISIT (OUTPATIENT)
Dept: BEHAVIORAL HEALTH | Age: 35
End: 2020-12-14
Attending: OBSTETRICS & GYNECOLOGY

## 2020-12-14 PROCEDURE — 90837 PSYTX W PT 60 MINUTES: CPT | Performed by: SOCIAL WORKER

## 2021-01-01 ENCOUNTER — EXTERNAL RECORD (OUTPATIENT)
Dept: OTHER | Age: 36
End: 2021-01-01

## 2021-02-23 ENCOUNTER — TELEPHONE (OUTPATIENT)
Dept: PULMONOLOGY | Age: 36
End: 2021-02-23

## 2021-02-23 ENCOUNTER — APPOINTMENT (OUTPATIENT)
Dept: PULMONOLOGY | Age: 36
End: 2021-02-23

## 2021-02-26 ENCOUNTER — APPOINTMENT (OUTPATIENT)
Dept: PULMONOLOGY | Age: 36
End: 2021-02-26

## 2021-03-12 ENCOUNTER — TELEPHONE (OUTPATIENT)
Dept: PULMONOLOGY | Age: 36
End: 2021-03-12

## 2021-03-15 ENCOUNTER — OFFICE VISIT (OUTPATIENT)
Dept: PULMONOLOGY | Age: 36
End: 2021-03-15

## 2021-03-15 ENCOUNTER — IMAGING SERVICES (OUTPATIENT)
Dept: GENERAL RADIOLOGY | Age: 36
End: 2021-03-15
Attending: INTERNAL MEDICINE

## 2021-03-15 DIAGNOSIS — D86.9 SARCOIDOSIS: Primary | ICD-10-CM

## 2021-03-15 DIAGNOSIS — J45.40 MODERATE PERSISTENT ASTHMA, UNSPECIFIED WHETHER COMPLICATED: ICD-10-CM

## 2021-03-15 DIAGNOSIS — D86.9 SARCOIDOSIS: ICD-10-CM

## 2021-03-15 PROCEDURE — 71046 X-RAY EXAM CHEST 2 VIEWS: CPT | Performed by: RADIOLOGY

## 2021-03-15 PROCEDURE — 99214 OFFICE O/P EST MOD 30 MIN: CPT | Performed by: INTERNAL MEDICINE

## 2021-03-15 RX ORDER — FLUTICASONE PROPIONATE AND SALMETEROL 500; 50 UG/1; UG/1
1 POWDER RESPIRATORY (INHALATION)
Qty: 1 EACH | Refills: 3 | Status: SHIPPED | OUTPATIENT
Start: 2021-03-15 | End: 2021-05-18

## 2021-03-15 RX ORDER — PREDNISONE 10 MG/1
TABLET ORAL
Qty: 30 TABLET | Refills: 0 | Status: SHIPPED | OUTPATIENT
Start: 2021-03-15 | End: 2021-04-12 | Stop reason: ALTCHOICE

## 2021-03-15 RX ORDER — ALBUTEROL SULFATE 90 UG/1
AEROSOL, METERED RESPIRATORY (INHALATION)
Qty: 1 INHALER | Refills: 5 | Status: SHIPPED | OUTPATIENT
Start: 2021-03-15 | End: 2021-04-29 | Stop reason: SDUPTHER

## 2021-03-15 ASSESSMENT — PAIN SCALES - GENERAL: PAINLEVEL: 0

## 2021-03-16 DIAGNOSIS — R93.89 ABNORMAL CHEST X-RAY: Primary | ICD-10-CM

## 2021-04-06 ENCOUNTER — APPOINTMENT (OUTPATIENT)
Dept: PULMONOLOGY | Age: 36
End: 2021-04-06
Attending: INTERNAL MEDICINE

## 2021-04-12 ENCOUNTER — LAB REQUISITION (OUTPATIENT)
Dept: LAB | Age: 36
End: 2021-04-12

## 2021-04-12 ENCOUNTER — TELEPHONE (OUTPATIENT)
Dept: PULMONOLOGY | Age: 36
End: 2021-04-12

## 2021-04-12 ENCOUNTER — WALK IN (OUTPATIENT)
Dept: URGENT CARE | Age: 36
End: 2021-04-12

## 2021-04-12 VITALS
OXYGEN SATURATION: 99 % | DIASTOLIC BLOOD PRESSURE: 86 MMHG | RESPIRATION RATE: 18 BRPM | TEMPERATURE: 98.4 F | SYSTOLIC BLOOD PRESSURE: 132 MMHG | HEART RATE: 84 BPM

## 2021-04-12 DIAGNOSIS — J45.901 UNSPECIFIED ASTHMA WITH (ACUTE) EXACERBATION: ICD-10-CM

## 2021-04-12 DIAGNOSIS — J45.901 EXACERBATION OF ASTHMA, UNSPECIFIED ASTHMA SEVERITY, UNSPECIFIED WHETHER PERSISTENT: Primary | ICD-10-CM

## 2021-04-12 DIAGNOSIS — J06.9 ACUTE UPPER RESPIRATORY INFECTION, UNSPECIFIED: ICD-10-CM

## 2021-04-12 DIAGNOSIS — Z20.822 CONTACT WITH AND (SUSPECTED) EXPOSURE TO COVID-19: ICD-10-CM

## 2021-04-12 DIAGNOSIS — Z20.822 SUSPECTED COVID-19 VIRUS INFECTION: ICD-10-CM

## 2021-04-12 DIAGNOSIS — J06.9 ACUTE URI: ICD-10-CM

## 2021-04-12 PROCEDURE — U0005 INFEC AGEN DETEC AMPLI PROBE: HCPCS | Performed by: FAMILY MEDICINE

## 2021-04-12 PROCEDURE — PSEU10635 SARS-COV-2 (2019-NCOV) BY PCR PB BKR PSEUDO CODE: Performed by: CLINICAL MEDICAL LABORATORY

## 2021-04-12 PROCEDURE — U0003 INFECTIOUS AGENT DETECTION BY NUCLEIC ACID (DNA OR RNA); SEVERE ACUTE RESPIRATORY SYNDROME CORONAVIRUS 2 (SARS-COV-2) (CORONAVIRUS DISEASE [COVID-19]), AMPLIFIED PROBE TECHNIQUE, MAKING USE OF HIGH THROUGHPUT TECHNOLOGIES AS DESCRIBED BY CMS-2020-01-R: HCPCS | Performed by: CLINICAL MEDICAL LABORATORY

## 2021-04-12 PROCEDURE — 99214 OFFICE O/P EST MOD 30 MIN: CPT | Performed by: FAMILY MEDICINE

## 2021-04-12 PROCEDURE — U0005 INFEC AGEN DETEC AMPLI PROBE: HCPCS | Performed by: CLINICAL MEDICAL LABORATORY

## 2021-04-12 PROCEDURE — U0003 INFECTIOUS AGENT DETECTION BY NUCLEIC ACID (DNA OR RNA); SEVERE ACUTE RESPIRATORY SYNDROME CORONAVIRUS 2 (SARS-COV-2) (CORONAVIRUS DISEASE [COVID-19]), AMPLIFIED PROBE TECHNIQUE, MAKING USE OF HIGH THROUGHPUT TECHNOLOGIES AS DESCRIBED BY CMS-2020-01-R: HCPCS | Performed by: FAMILY MEDICINE

## 2021-04-12 RX ORDER — PREDNISONE 20 MG/1
40 TABLET ORAL DAILY
Qty: 10 TABLET | Refills: 0 | Status: SHIPPED | OUTPATIENT
Start: 2021-04-12 | End: 2021-04-17

## 2021-04-13 LAB
SARS-COV-2 RNA RESP QL NAA+PROBE: NOT DETECTED
SARS-COV-2 RNA RESP QL NAA+PROBE: NOT DETECTED
SERVICE CMNT-IMP: NORMAL

## 2021-04-16 ENCOUNTER — TELEPHONE (OUTPATIENT)
Dept: PULMONOLOGY | Age: 36
End: 2021-04-16

## 2021-04-19 ENCOUNTER — OFFICE VISIT (OUTPATIENT)
Dept: PULMONOLOGY | Age: 36
End: 2021-04-19

## 2021-04-19 ENCOUNTER — LAB SERVICES (OUTPATIENT)
Dept: LAB | Age: 36
End: 2021-04-19

## 2021-04-19 ENCOUNTER — IMAGING SERVICES (OUTPATIENT)
Dept: CT IMAGING | Age: 36
End: 2021-04-19
Attending: INTERNAL MEDICINE

## 2021-04-19 DIAGNOSIS — D86.9 SARCOIDOSIS: ICD-10-CM

## 2021-04-19 DIAGNOSIS — J45.991 COUGH VARIANT ASTHMA: ICD-10-CM

## 2021-04-19 DIAGNOSIS — R05.9 COUGH: Primary | ICD-10-CM

## 2021-04-19 DIAGNOSIS — J45.991 COUGH VARIANT ASTHMA: Primary | ICD-10-CM

## 2021-04-19 DIAGNOSIS — R93.89 ABNORMAL CHEST X-RAY: ICD-10-CM

## 2021-04-19 LAB
ALBUMIN SERPL-MCNC: 4.1 G/DL (ref 3.6–5.1)
ALP SERPL-CCNC: 69 U/L (ref 45–130)
ALT SERPL W/O P-5'-P-CCNC: 10 U/L (ref 4–38)
AST SERPL-CCNC: 17 U/L (ref 14–43)
BASOPHIL %: 0.5 % (ref 0–1.2)
BASOPHIL ABSOLUTE #: 0 10*3/UL (ref 0–0.1)
BILIRUB SERPL-MCNC: 0.2 MG/DL (ref 0–1.3)
BUN SERPL-MCNC: 15 MG/DL (ref 7–20)
CALCIUM SERPL-MCNC: 9.4 MG/DL (ref 8.6–10.6)
CHLORIDE SERPL-SCNC: 104 MMOL/L (ref 96–107)
CO2 SERPL-SCNC: 27 MMOL/L (ref 22–32)
CREAT SERPL-MCNC: 0.6 MG/DL (ref 0.5–1.4)
DIFFERENTIAL TYPE: ABNORMAL
EOSINOPHIL %: 1.4 % (ref 0–10)
EOSINOPHIL ABSOLUTE #: 0.1 10*3/UL (ref 0–0.5)
GFR SERPL CREATININE-BSD FRML MDRD: >60 ML/MIN/{1.73M2}
GFR SERPL CREATININE-BSD FRML MDRD: >60 ML/MIN/{1.73M2}
GFR SERPLBLD BASED ON 1.73 SQ M-ARVRAT: >60 ML/MIN
GLUCOSE P FAST SERPL-MCNC: 85 MG/DL (ref 60–100)
HEMATOCRIT: 36.2 % (ref 34–45)
HEMOGLOBIN: 11.3 G/DL (ref 11.2–15.7)
IMMATURE GRANULOCYTE ABSOLUTE: 0.02 10*3/UL (ref 0–0.05)
IMMATURE GRANULOCYTE PERCENT: 0.5 % (ref 0–0.5)
LYMPH PERCENT: 29.1 % (ref 20.5–51.1)
LYMPHOCYTE ABSOLUTE #: 1.3 10*3/UL (ref 1.2–3.4)
MEAN CORPUSCULAR HGB CONCENTRATION: 31.2 % (ref 32–36)
MEAN CORPUSCULAR HGB: 25.7 PG (ref 27–34)
MEAN CORPUSCULAR VOLUME: 82.3 FL (ref 79–95)
MEAN PLATELET VOLUME: 11.1 FL (ref 8.6–12.4)
MONOCYTE ABSOLUTE #: 0.6 10*3/UL (ref 0.2–0.9)
MONOCYTE PERCENT: 13.3 % (ref 4.3–12.9)
NEUTROPHIL ABSOLUTE #: 2.4 10*3/UL (ref 1.4–6.5)
NEUTROPHIL PERCENT: 55.2 % (ref 34–73.5)
PLATELET COUNT: 145 10*3/UL (ref 150–400)
POTASSIUM SERPL-SCNC: 3.7 MMOL/L (ref 3.5–5.3)
PROT SERPL-MCNC: 6.4 G/DL (ref 6.4–8.5)
RED BLOOD CELL COUNT: 4.4 10*6/UL (ref 3.7–5.2)
RED CELL DISTRIBUTION WIDTH: 13.3 % (ref 11.3–14.8)
SODIUM SERPL-SCNC: 140 MMOL/L (ref 136–146)
WHITE BLOOD CELL COUNT: 4.4 10*3/UL (ref 4–10)

## 2021-04-19 PROCEDURE — 71260 CT THORAX DX C+: CPT | Performed by: RADIOLOGY

## 2021-04-19 PROCEDURE — 82565 ASSAY OF CREATININE: CPT | Performed by: INTERNAL MEDICINE

## 2021-04-19 PROCEDURE — 36415 COLL VENOUS BLD VENIPUNCTURE: CPT | Performed by: INTERNAL MEDICINE

## 2021-04-19 PROCEDURE — 85025 COMPLETE CBC W/AUTO DIFF WBC: CPT | Performed by: INTERNAL MEDICINE

## 2021-04-19 PROCEDURE — 80053 COMPREHEN METABOLIC PANEL: CPT | Performed by: INTERNAL MEDICINE

## 2021-04-19 PROCEDURE — 99214 OFFICE O/P EST MOD 30 MIN: CPT | Performed by: INTERNAL MEDICINE

## 2021-04-19 RX ORDER — MONTELUKAST SODIUM 10 MG/1
10 TABLET ORAL EVERY EVENING
Qty: 30 TABLET | Refills: 5 | Status: SHIPPED | OUTPATIENT
Start: 2021-04-19 | End: 2021-10-16

## 2021-04-19 RX ORDER — AZITHROMYCIN 250 MG/1
250 TABLET, FILM COATED ORAL DAILY
Qty: 5 TABLET | Refills: 0 | Status: SHIPPED | OUTPATIENT
Start: 2021-04-19 | End: 2021-04-24

## 2021-04-19 ASSESSMENT — PAIN SCALES - GENERAL: PAINLEVEL: 0

## 2021-04-21 DIAGNOSIS — R76.0 LUPUS ANTICOAGULANT POSITIVE: Primary | ICD-10-CM

## 2021-04-21 DIAGNOSIS — D86.9 SARCOIDOSIS: ICD-10-CM

## 2021-04-21 DIAGNOSIS — R91.8 ABNORMAL RADIOLOGIC FINDING OF LUNG FIELD: ICD-10-CM

## 2021-04-21 DIAGNOSIS — D86.9 SARCOIDOSIS: Primary | ICD-10-CM

## 2021-04-27 LAB
SARS-COV-2 RNA SPEC QL NAA+PROBE: NEGATIVE
SPECIMEN SOURCE: NORMAL

## 2021-04-29 ENCOUNTER — APPOINTMENT (OUTPATIENT)
Dept: OTHER | Facility: PHYSICIAN GROUP | Age: 36
End: 2021-04-29

## 2021-04-29 ENCOUNTER — EXTERNAL RECORD (OUTPATIENT)
Dept: HEALTH INFORMATION MANAGEMENT | Facility: OTHER | Age: 36
End: 2021-04-29

## 2021-04-29 ENCOUNTER — DOCUMENTATION (OUTPATIENT)
Dept: PULMONOLOGY | Age: 36
End: 2021-04-29

## 2021-04-29 DIAGNOSIS — D86.9 SARCOIDOSIS: Primary | ICD-10-CM

## 2021-04-29 LAB — SARS-COV-2 RNA SPEC QL NAA+PROBE: NOT DETECTED

## 2021-04-29 PROCEDURE — 31624 DX BRONCHOSCOPE/LAVAGE: CPT | Performed by: INTERNAL MEDICINE

## 2021-04-29 RX ORDER — PREDNISONE 10 MG/1
TABLET ORAL
Qty: 30 TABLET | Refills: 0 | Status: SHIPPED | OUTPATIENT
Start: 2021-04-29 | End: 2021-05-18 | Stop reason: ALTCHOICE

## 2021-04-29 RX ORDER — ALBUTEROL SULFATE 90 UG/1
AEROSOL, METERED RESPIRATORY (INHALATION)
Qty: 1 EACH | Refills: 11 | Status: SHIPPED | OUTPATIENT
Start: 2021-04-29

## 2021-05-12 ENCOUNTER — TELEPHONE (OUTPATIENT)
Dept: PULMONOLOGY | Age: 36
End: 2021-05-12

## 2021-05-12 ENCOUNTER — APPOINTMENT (OUTPATIENT)
Dept: LAB | Age: 36
End: 2021-05-12

## 2021-05-17 ENCOUNTER — IMMUNIZATION (OUTPATIENT)
Dept: LAB | Age: 36
End: 2021-05-17

## 2021-05-17 ENCOUNTER — TELEPHONE (OUTPATIENT)
Dept: PULMONOLOGY | Age: 36
End: 2021-05-17

## 2021-05-17 DIAGNOSIS — Z23 NEED FOR VACCINATION: Primary | ICD-10-CM

## 2021-05-17 PROCEDURE — 91301 COVID-19 MODERNA VACCINE: CPT | Performed by: HOSPITALIST

## 2021-05-17 PROCEDURE — 0011A COVID-19 MODERNA VACCINE: CPT | Performed by: HOSPITALIST

## 2021-05-18 ENCOUNTER — LAB SERVICES (OUTPATIENT)
Dept: LAB | Age: 36
End: 2021-05-18

## 2021-05-18 ENCOUNTER — LAB REQUISITION (OUTPATIENT)
Dept: LAB | Age: 36
End: 2021-05-18

## 2021-05-18 ENCOUNTER — OFFICE VISIT (OUTPATIENT)
Dept: PULMONOLOGY | Age: 36
End: 2021-05-18

## 2021-05-18 DIAGNOSIS — D86.9 SARCOIDOSIS: ICD-10-CM

## 2021-05-18 DIAGNOSIS — D86.9 SARCOIDOSIS, UNSPECIFIED: ICD-10-CM

## 2021-05-18 DIAGNOSIS — D86.9 SARCOIDOSIS: Primary | ICD-10-CM

## 2021-05-18 PROCEDURE — 82164 ANGIOTENSIN I ENZYME TEST: CPT | Performed by: INTERNAL MEDICINE

## 2021-05-18 PROCEDURE — 82164 ANGIOTENSIN I ENZYME TEST: CPT | Performed by: CLINICAL MEDICAL LABORATORY

## 2021-05-18 PROCEDURE — 36415 COLL VENOUS BLD VENIPUNCTURE: CPT | Performed by: INTERNAL MEDICINE

## 2021-05-18 PROCEDURE — PSEU9346 ANGIOTENSIN CONVERTING ENZYME: Performed by: CLINICAL MEDICAL LABORATORY

## 2021-05-18 PROCEDURE — 99215 OFFICE O/P EST HI 40 MIN: CPT | Performed by: INTERNAL MEDICINE

## 2021-05-18 RX ORDER — PREDNISONE 10 MG/1
10 TABLET ORAL DAILY
Qty: 150 TABLET | Refills: 3 | Status: SHIPPED | OUTPATIENT
Start: 2021-05-18

## 2021-05-18 ASSESSMENT — PAIN SCALES - GENERAL: PAINLEVEL: 0

## 2021-05-20 LAB
ACE SERPL-CCNC: 46 U/L (ref 9–67)
ACE SERPL-CCNC: 46 U/L (ref 9–67)

## 2021-05-25 VITALS
BODY MASS INDEX: 18.99 KG/M2 | HEART RATE: 98 BPM | WEIGHT: 114 LBS | DIASTOLIC BLOOD PRESSURE: 64 MMHG | TEMPERATURE: 98 F | RESPIRATION RATE: 18 BRPM | SYSTOLIC BLOOD PRESSURE: 117 MMHG | DIASTOLIC BLOOD PRESSURE: 80 MMHG | SYSTOLIC BLOOD PRESSURE: 160 MMHG | TEMPERATURE: 98.3 F | OXYGEN SATURATION: 99 % | RESPIRATION RATE: 18 BRPM | BODY MASS INDEX: 21.13 KG/M2 | DIASTOLIC BLOOD PRESSURE: 66 MMHG | WEIGHT: 126.98 LBS | OXYGEN SATURATION: 100 % | SYSTOLIC BLOOD PRESSURE: 114 MMHG | HEART RATE: 89 BPM | TEMPERATURE: 97.8 F | HEART RATE: 85 BPM | DIASTOLIC BLOOD PRESSURE: 100 MMHG | SYSTOLIC BLOOD PRESSURE: 122 MMHG | HEIGHT: 65 IN | WEIGHT: 116.4 LBS | HEIGHT: 65 IN | RESPIRATION RATE: 14 BRPM | OXYGEN SATURATION: 97 % | RESPIRATION RATE: 18 BRPM | HEART RATE: 108 BPM | RESPIRATION RATE: 18 BRPM | TEMPERATURE: 98.9 F | HEIGHT: 65 IN | BODY MASS INDEX: 19.39 KG/M2 | TEMPERATURE: 97 F | OXYGEN SATURATION: 97 % | BODY MASS INDEX: 18.83 KG/M2 | TEMPERATURE: 98.3 F | TEMPERATURE: 98.4 F | RESPIRATION RATE: 14 BRPM | WEIGHT: 113 LBS | SYSTOLIC BLOOD PRESSURE: 110 MMHG | DIASTOLIC BLOOD PRESSURE: 76 MMHG | HEIGHT: 65 IN | DIASTOLIC BLOOD PRESSURE: 71 MMHG | HEART RATE: 99 BPM | HEART RATE: 92 BPM | RESPIRATION RATE: 14 BRPM | HEART RATE: 97 BPM | SYSTOLIC BLOOD PRESSURE: 123 MMHG | DIASTOLIC BLOOD PRESSURE: 73 MMHG | RESPIRATION RATE: 18 BRPM | TEMPERATURE: 97.8 F | WEIGHT: 113 LBS | SYSTOLIC BLOOD PRESSURE: 120 MMHG | DIASTOLIC BLOOD PRESSURE: 68 MMHG | BODY MASS INDEX: 18.83 KG/M2 | SYSTOLIC BLOOD PRESSURE: 121 MMHG | HEART RATE: 85 BPM

## 2021-06-15 ENCOUNTER — APPOINTMENT (OUTPATIENT)
Dept: PULMONOLOGY | Age: 36
End: 2021-06-15

## 2021-06-19 ENCOUNTER — IMMUNIZATION (OUTPATIENT)
Dept: LAB | Age: 36
End: 2021-06-19
Attending: HOSPITALIST

## 2021-06-19 DIAGNOSIS — Z23 NEED FOR VACCINATION: Primary | ICD-10-CM

## 2021-06-19 PROCEDURE — 91301 COVID-19 MODERNA VACCINE: CPT | Performed by: HOSPITALIST

## 2021-06-19 PROCEDURE — 0012A COVID-19 MODERNA VACCINE: CPT | Performed by: HOSPITALIST

## 2021-06-22 ENCOUNTER — TELEPHONE (OUTPATIENT)
Dept: PULMONOLOGY | Age: 36
End: 2021-06-22

## 2021-06-23 ENCOUNTER — LAB SERVICES (OUTPATIENT)
Dept: URGENT CARE | Age: 36
End: 2021-06-23

## 2021-06-23 DIAGNOSIS — D86.9 SARCOIDOSIS: ICD-10-CM

## 2021-06-23 PROCEDURE — U0005 INFEC AGEN DETEC AMPLI PROBE: HCPCS | Performed by: INTERNAL MEDICINE

## 2021-06-23 PROCEDURE — U0003 INFECTIOUS AGENT DETECTION BY NUCLEIC ACID (DNA OR RNA); SEVERE ACUTE RESPIRATORY SYNDROME CORONAVIRUS 2 (SARS-COV-2) (CORONAVIRUS DISEASE [COVID-19]), AMPLIFIED PROBE TECHNIQUE, MAKING USE OF HIGH THROUGHPUT TECHNOLOGIES AS DESCRIBED BY CMS-2020-01-R: HCPCS | Performed by: INTERNAL MEDICINE

## 2021-06-24 LAB
SARS-COV-2 RNA RESP QL NAA+PROBE: NOT DETECTED
SERVICE CMNT-IMP: NORMAL
SERVICE CMNT-IMP: NORMAL

## 2021-06-25 ENCOUNTER — OFFICE VISIT (OUTPATIENT)
Dept: PULMONOLOGY | Age: 36
End: 2021-06-25
Attending: INTERNAL MEDICINE

## 2021-06-25 VITALS — WEIGHT: 111 LBS | HEIGHT: 65 IN | BODY MASS INDEX: 18.49 KG/M2

## 2021-06-25 DIAGNOSIS — D86.9 SARCOIDOSIS: Primary | ICD-10-CM

## 2021-06-25 PROCEDURE — 94729 DIFFUSING CAPACITY: CPT | Performed by: INTERNAL MEDICINE

## 2021-06-25 PROCEDURE — 94060 EVALUATION OF WHEEZING: CPT | Performed by: INTERNAL MEDICINE

## 2021-06-25 PROCEDURE — 94727 GAS DIL/WSHOT DETER LNG VOL: CPT | Performed by: INTERNAL MEDICINE

## 2021-07-09 ENCOUNTER — TELEPHONE (OUTPATIENT)
Dept: OBGYN | Age: 36
End: 2021-07-09

## 2021-07-19 ENCOUNTER — APPOINTMENT (OUTPATIENT)
Dept: PULMONOLOGY | Age: 36
End: 2021-07-19

## 2021-08-02 ENCOUNTER — OFFICE VISIT (OUTPATIENT)
Dept: FAMILY MEDICINE CLINIC | Facility: CLINIC | Age: 36
End: 2021-08-02

## 2021-08-02 ENCOUNTER — TELEPHONE (OUTPATIENT)
Dept: FAMILY MEDICINE CLINIC | Facility: CLINIC | Age: 36
End: 2021-08-02

## 2021-08-02 VITALS
OXYGEN SATURATION: 98 % | BODY MASS INDEX: 18.58 KG/M2 | DIASTOLIC BLOOD PRESSURE: 74 MMHG | HEIGHT: 65 IN | SYSTOLIC BLOOD PRESSURE: 110 MMHG | WEIGHT: 111.5 LBS | TEMPERATURE: 98 F | HEART RATE: 88 BPM | RESPIRATION RATE: 16 BRPM

## 2021-08-02 DIAGNOSIS — Z00.00 ANNUAL PHYSICAL EXAM: Primary | ICD-10-CM

## 2021-08-02 DIAGNOSIS — D86.9 SARCOIDOSIS: ICD-10-CM

## 2021-08-02 DIAGNOSIS — Z76.89 ENCOUNTER TO ESTABLISH CARE WITH NEW DOCTOR: ICD-10-CM

## 2021-08-02 PROBLEM — Z83.2 FAMILY HISTORY OF AUTOIMMUNE DISORDER: Status: ACTIVE | Noted: 2021-08-02

## 2021-08-02 PROBLEM — I28.0 RIGHT TO LEFT CARDIAC SHUNT (HCC): Status: ACTIVE | Noted: 2019-08-30

## 2021-08-02 PROBLEM — Z86.2 HISTORY OF ANEMIA: Status: ACTIVE | Noted: 2020-03-05

## 2021-08-02 PROBLEM — IMO0002 RIGHT TO LEFT CARDIAC SHUNT: Status: ACTIVE | Noted: 2019-08-30

## 2021-08-02 PROBLEM — R94.2 RESTRICTIVE PATTERN PRESENT ON PULMONARY FUNCTION TESTING: Status: ACTIVE | Noted: 2018-03-05

## 2021-08-02 PROBLEM — J45.991 COUGH VARIANT ASTHMA (HCC): Status: ACTIVE | Noted: 2017-03-17

## 2021-08-02 PROBLEM — E07.9 ASYMMETRICAL THYROID: Status: ACTIVE | Noted: 2018-03-05

## 2021-08-02 PROBLEM — J45.991 COUGH VARIANT ASTHMA: Status: ACTIVE | Noted: 2017-03-17

## 2021-08-02 PROCEDURE — 3078F DIAST BP <80 MM HG: CPT | Performed by: FAMILY MEDICINE

## 2021-08-02 PROCEDURE — 99385 PREV VISIT NEW AGE 18-39: CPT | Performed by: FAMILY MEDICINE

## 2021-08-02 PROCEDURE — 3074F SYST BP LT 130 MM HG: CPT | Performed by: FAMILY MEDICINE

## 2021-08-02 PROCEDURE — 3008F BODY MASS INDEX DOCD: CPT | Performed by: FAMILY MEDICINE

## 2021-08-02 RX ORDER — FLUTICASONE PROPIONATE AND SALMETEROL 500; 50 UG/1; UG/1
1 POWDER RESPIRATORY (INHALATION) DAILY
COMMUNITY
Start: 2020-03-09

## 2021-08-02 RX ORDER — ALBUTEROL SULFATE 90 UG/1
2 AEROSOL, METERED RESPIRATORY (INHALATION) EVERY 4 HOURS PRN
COMMUNITY
Start: 2021-04-29

## 2021-08-02 RX ORDER — PREDNISONE 20 MG/1
20 TABLET ORAL DAILY
Qty: 20 TABLET | Refills: 0 | Status: SHIPPED | OUTPATIENT
Start: 2021-08-02 | End: 2021-08-22

## 2021-08-02 RX ORDER — PREDNISONE 20 MG/1
20 TABLET ORAL DAILY
COMMUNITY
Start: 2021-04-12 | End: 2021-12-01

## 2021-08-02 NOTE — PROGRESS NOTES
[facility] Family Medicine Office Note  Chief Complaint:   Patient presents with:  Physical  New Patient      HPI:   This is a 37year old female coming in for CPX and to establish care with new provider.    Pertinent Hx of sarcoidosis with extens the lungs every 4 (four) hours as needed. • fluticasone-salmeterol 500-50 MCG/DOSE Inhalation Aerosol Powder, Breath Activated Inhale 1 puff into the lungs daily. • predniSONE 20 MG Oral Tab Take 1 tablet (20 mg total) by mouth daily for 20 days.  2 Maintenance:  Annual Physical Never done  Annual Depression Screen Never done  Pap Smear,3 Years Never done    Patient/Caregiver Education: Patient/Caregiver Education: There are no barriers to learning. Medical education done.    Outcome: Patient verbalize

## 2021-08-04 ENCOUNTER — TELEPHONE (OUTPATIENT)
Dept: PULMONOLOGY | Age: 36
End: 2021-08-04

## 2021-08-05 ENCOUNTER — TELEPHONE (OUTPATIENT)
Dept: FAMILY MEDICINE CLINIC | Facility: CLINIC | Age: 36
End: 2021-08-05

## 2021-08-05 NOTE — TELEPHONE ENCOUNTER
PT CALLED AND ADV THAT SHE WAS SEEN ON Monday AND WANTED TO KNOW IF  RECEIVED RECORDS FROM [facility]?     WAS TO CALL AND ADV THE TYPE OF CANCER THAT FATHER HAS -  MATLE CELL LYMPHOMA    ALSO,     PT HAS BOOKED FAMILY TRIP MONTHS AGO TO [place], WAS WONDER

## 2021-08-05 NOTE — TELEPHONE ENCOUNTER
[facility] sent to pt regarding letter sent  Routing to nurse pool for follow-up message read by pt

## 2021-08-05 NOTE — TELEPHONE ENCOUNTER
Pt advised that no records received at this time. Pt reports that it was a personal request for records, was wondering if it would be quicker if our office requested records.  Pt advised if she signed a \"release of health information\" consent, she does n

## 2021-08-05 NOTE — TELEPHONE ENCOUNTER
Kisha Goyal MD  You 11 minutes ago (4:52 PM)     Agreed ! It is in her and her family's best interest to cancel the trip considering the rise in [place] cases, especially with her history of Sarcoidosis and because she is on daily Prednisone.

## 2021-08-06 ENCOUNTER — TELEPHONE (OUTPATIENT)
Dept: FAMILY MEDICINE CLINIC | Facility: CLINIC | Age: 36
End: 2021-08-06

## 2021-08-06 NOTE — TELEPHONE ENCOUNTER
Called pt - wanted to confirm which [facility] we are expecting records from. Pt reports from [facility] and [place]. Pt advised that we do have access to those records via [address] [facility] She verbalized understanding.       Pt reports she needs

## 2021-09-01 ENCOUNTER — APPOINTMENT (OUTPATIENT)
Dept: OPHTHALMOLOGY | Age: 36
End: 2021-09-01

## 2021-09-13 ENCOUNTER — DOCUMENTATION (OUTPATIENT)
Dept: PULMONOLOGY | Age: 36
End: 2021-09-13

## 2021-09-18 ENCOUNTER — HOSPITAL ENCOUNTER (OUTPATIENT)
Age: 36
Discharge: HOME | End: 2021-09-18
Payer: COMMERCIAL

## 2021-09-18 VITALS
OXYGEN SATURATION: 100 % | DIASTOLIC BLOOD PRESSURE: 81 MMHG | HEART RATE: 87 BPM | TEMPERATURE: 99 F | BODY MASS INDEX: 18.33 KG/M2 | SYSTOLIC BLOOD PRESSURE: 122 MMHG | RESPIRATION RATE: 18 BRPM | HEIGHT: 65 IN | WEIGHT: 110 LBS

## 2021-09-18 DIAGNOSIS — B34.9 VIRAL SYNDROME: Primary | ICD-10-CM

## 2021-09-18 DIAGNOSIS — U07.1 COVID-19: ICD-10-CM

## 2021-09-18 LAB — SARS-COV-2 RNA RESP QL NAA+PROBE: DETECTED

## 2021-09-18 PROCEDURE — 99204 OFFICE O/P NEW MOD 45 MIN: CPT | Performed by: NURSE PRACTITIONER

## 2021-09-18 PROCEDURE — M0243 CASIRIVI AND IMDEVI INFUSION: HCPCS | Performed by: NURSE PRACTITIONER

## 2021-09-18 PROCEDURE — U0002 COVID-19 LAB TEST NON-CDC: HCPCS | Performed by: NURSE PRACTITIONER

## 2021-09-18 NOTE — ED PROVIDER NOTES
Patient Seen in: Immediate [facility]      History   Patient presents with:  Cough/URI    Stated Complaint: fever, bodyaches, cough    Subjective:   73-year-old female who presents to the IC with complaints of fever body aches cough that started on Thur (Oral)   Resp 18   Ht 165.1 cm (5' 5\")   Wt 49.9 kg   LMP 09/14/2021   SpO2 100%   BMI 18.30 kg/m²         Physical Exam  GENERAL: The patient is well-developed well-nourished nontoxic, non-ill-appearing  HEENT: Normocephalic. Atraumatic.   Extraocular mo and have the following risks factors: Lupus, sarcoidosis.     The patient/caregiver has been given the “Fact Sheet for Patients, Parents and Caregivers”, informed of alternatives to receiving authorized Regen-COV, and informed that casirivimab and imdevimab

## 2021-09-18 NOTE — ED INITIAL ASSESSMENT (HPI)
Pt with c/o chills, body aches and fever that started on Thursday. Pt c/o ongoing cough from sarcoidosis.   C/o congestion

## 2021-09-18 NOTE — ED QUICK NOTES
Pt reviewed regeneron information sheet. All questions answered. Pt verbalized understanding. Pt states understands risks/benefits to medication prior to administration.

## 2021-09-20 ENCOUNTER — TELEPHONE (OUTPATIENT)
Dept: CASE MANAGEMENT | Age: 36
End: 2021-09-20

## 2021-09-20 NOTE — TELEPHONE ENCOUNTER
Pt received PAB infusion at [facility] on 9/18/21 for COVID-19. Please follow-up with pt for post-infusion assessment and home monitoring if needed. Thank you.

## 2021-09-20 NOTE — TELEPHONE ENCOUNTER
Pt reports she is feeling better, better than she did on day 1    She reports as long as she stays on top of medications - tylenol and advil, is doing okay at home.     She reports that Dr. Larisa Cheng wanted for pt to see pulmonologist, was going to see on We

## 2021-09-21 ENCOUNTER — TELEMEDICINE (OUTPATIENT)
Dept: FAMILY MEDICINE CLINIC | Facility: CLINIC | Age: 36
End: 2021-09-21

## 2021-09-21 DIAGNOSIS — S91.311A LACERATION OF DORSUM OF FOOT, RIGHT, INITIAL ENCOUNTER: Primary | ICD-10-CM

## 2021-09-21 DIAGNOSIS — L03.115 CELLULITIS OF RIGHT FOOT: ICD-10-CM

## 2021-09-21 PROCEDURE — 99213 OFFICE O/P EST LOW 20 MIN: CPT | Performed by: NURSE PRACTITIONER

## 2021-09-21 RX ORDER — SULFAMETHOXAZOLE AND TRIMETHOPRIM 800; 160 MG/1; MG/1
1 TABLET ORAL 2 TIMES DAILY
Qty: 14 TABLET | Refills: 0 | Status: SHIPPED | OUTPATIENT
Start: 2021-09-21 | End: 2021-09-28

## 2021-09-21 NOTE — PROGRESS NOTES
Subjective:   Patient ID: Vincent Nicole is a 31year old female. Vincent Nicole  verbally consents to a Virtual/Telephone Check-In service on 9/21/2021.     Patient understands and accepts financial responsibility for any deductible, co-insurance and/or c Neurological:      Mental Status: She is alert.    Psychiatric:         Mood and Affect: Mood normal.         Assessment & Plan:   Laceration of dorsum of foot, right, initial encounter  (primary encounter diagnosis)  Cellulitis of right foot    No orders

## 2021-09-21 NOTE — PATIENT INSTRUCTIONS
Begin taking antibiotic as prescribed. Make sure to take with food and water. Keep foot elevated and ice as needed ( 10-15 minutes on and off throughout the day). No skin to ice contact to avoid burns.    Look out for signs of worsening such as red streak

## 2021-10-13 ENCOUNTER — LAB ENCOUNTER (OUTPATIENT)
Dept: LAB | Age: 36
End: 2021-10-13
Attending: INTERNAL MEDICINE
Payer: COMMERCIAL

## 2021-10-13 DIAGNOSIS — D86.0 PULMONARY SARCOIDOSIS (HCC): Primary | ICD-10-CM

## 2021-10-13 PROCEDURE — 80053 COMPREHEN METABOLIC PANEL: CPT

## 2021-10-13 PROCEDURE — 36415 COLL VENOUS BLD VENIPUNCTURE: CPT

## 2021-10-13 PROCEDURE — 85025 COMPLETE CBC W/AUTO DIFF WBC: CPT

## 2021-10-13 PROCEDURE — 82164 ANGIOTENSIN I ENZYME TEST: CPT

## 2021-10-15 ENCOUNTER — TELEPHONE (OUTPATIENT)
Dept: FAMILY MEDICINE CLINIC | Facility: CLINIC | Age: 36
End: 2021-10-15

## 2021-10-15 ENCOUNTER — PATIENT MESSAGE (OUTPATIENT)
Dept: FAMILY MEDICINE CLINIC | Facility: CLINIC | Age: 36
End: 2021-10-15

## 2021-10-15 NOTE — TELEPHONE ENCOUNTER
Called and spoke with pt    Stated she tested positive for covid 09/18/2021    Since this is international travel - needs forms filled out    Pt reports she has been seeing/following w/ Pulmonology due to having symptoms still and hx sarcoidosis    Pt last

## 2021-10-15 NOTE — TELEPHONE ENCOUNTER
From: Jackson Rodriguez  To: Holland Lyles MD  Sent: 10/15/2021 2:31 PM CDT  Subject: Medical Form For Flight Reimbursement     Hi there, due to my covid and still on going symptoms we have another trip overseas we are cancelling and are needing t

## 2021-10-15 NOTE — TELEPHONE ENCOUNTER
Patient called requesting a call back from nurse.     RE:REINBURSEMENT FORMS FOR  CANCEL TRIP    Please call back # [phone]

## 2021-10-17 NOTE — TELEPHONE ENCOUNTER
If she has been seen by Pulmonology makes sense for them to fill this out. I have technically not seen or treated her for her symptoms.  ~ MM

## 2021-10-18 NOTE — TELEPHONE ENCOUNTER
[facility] sent to pt regarding doctor's note below  Routing to nurse pool for follow-up message read by pt

## 2021-11-09 ENCOUNTER — APPOINTMENT (OUTPATIENT)
Dept: GENERAL RADIOLOGY | Facility: HOSPITAL | Age: 36
End: 2021-11-09
Attending: EMERGENCY MEDICINE
Payer: COMMERCIAL

## 2021-11-09 ENCOUNTER — HOSPITAL ENCOUNTER (EMERGENCY)
Facility: HOSPITAL | Age: 36
Discharge: HOME | End: 2021-11-09
Attending: EMERGENCY MEDICINE
Payer: COMMERCIAL

## 2021-11-09 VITALS
SYSTOLIC BLOOD PRESSURE: 125 MMHG | TEMPERATURE: 98 F | BODY MASS INDEX: 19 KG/M2 | WEIGHT: 115 LBS | HEART RATE: 99 BPM | RESPIRATION RATE: 18 BRPM | DIASTOLIC BLOOD PRESSURE: 87 MMHG | OXYGEN SATURATION: 100 %

## 2021-11-09 DIAGNOSIS — J18.9 COMMUNITY ACQUIRED PNEUMONIA, UNSPECIFIED LATERALITY: Primary | ICD-10-CM

## 2021-11-09 PROCEDURE — 99284 EMERGENCY DEPT VISIT MOD MDM: CPT

## 2021-11-09 PROCEDURE — 84484 ASSAY OF TROPONIN QUANT: CPT | Performed by: EMERGENCY MEDICINE

## 2021-11-09 PROCEDURE — 93010 ELECTROCARDIOGRAM REPORT: CPT

## 2021-11-09 PROCEDURE — 80053 COMPREHEN METABOLIC PANEL: CPT | Performed by: EMERGENCY MEDICINE

## 2021-11-09 PROCEDURE — 36415 COLL VENOUS BLD VENIPUNCTURE: CPT

## 2021-11-09 PROCEDURE — 71045 X-RAY EXAM CHEST 1 VIEW: CPT | Performed by: EMERGENCY MEDICINE

## 2021-11-09 PROCEDURE — 93005 ELECTROCARDIOGRAM TRACING: CPT

## 2021-11-09 PROCEDURE — 85025 COMPLETE CBC W/AUTO DIFF WBC: CPT | Performed by: EMERGENCY MEDICINE

## 2021-11-09 PROCEDURE — 85379 FIBRIN DEGRADATION QUANT: CPT | Performed by: EMERGENCY MEDICINE

## 2021-11-09 RX ORDER — DOXYCYCLINE HYCLATE 100 MG/1
100 CAPSULE ORAL 2 TIMES DAILY
Qty: 20 CAPSULE | Refills: 0 | Status: SHIPPED | OUTPATIENT
Start: 2021-11-09 | End: 2021-11-19

## 2021-11-09 NOTE — ED PROVIDER NOTES
Patient Seen in: [facility] Emergency Department      History   Patient presents with:  Difficulty Breathing    Stated Complaint: SOB    Subjective:   HPI    30-year-old female who has a history of sarcoidosis comes to the hospital with a complaint focal deficits noted    ED Course     Labs Reviewed   COMP METABOLIC PANEL (14) - Abnormal; Notable for the following components:       Result Value    BUN 6 (*)     AST 11 (*)     Alkaline Phosphatase 104 (*)     Albumin 3.2 (*)     All other components w pulmonologist         MDM      Patient was screened and evaluated during this visit.    As a treating physician attending to the patient, I determined, within reasonable clinical confidence and prior to discharge, that an emergency medical condition was not

## 2021-11-09 NOTE — ED INITIAL ASSESSMENT (HPI)
36YF c/c of sob Pt state that she has a hx sarcoidosis Pt state that the last 2-3 days she been having cough, SOB and fevers

## 2021-11-15 ENCOUNTER — OFFICE VISIT (OUTPATIENT)
Dept: FAMILY MEDICINE CLINIC | Facility: CLINIC | Age: 36
End: 2021-11-15

## 2021-11-15 VITALS
DIASTOLIC BLOOD PRESSURE: 72 MMHG | BODY MASS INDEX: 19 KG/M2 | OXYGEN SATURATION: 98 % | SYSTOLIC BLOOD PRESSURE: 116 MMHG | TEMPERATURE: 98 F | RESPIRATION RATE: 18 BRPM | HEART RATE: 82 BPM | WEIGHT: 116.38 LBS

## 2021-11-15 DIAGNOSIS — J18.9 COMMUNITY ACQUIRED PNEUMONIA, UNSPECIFIED LATERALITY: ICD-10-CM

## 2021-11-15 DIAGNOSIS — R01.1 HEART MURMUR: ICD-10-CM

## 2021-11-15 DIAGNOSIS — Z86.16 HISTORY OF COVID-19: ICD-10-CM

## 2021-11-15 DIAGNOSIS — R05.8 SPASMODIC COUGH: ICD-10-CM

## 2021-11-15 DIAGNOSIS — D86.9 SARCOIDOSIS: Primary | ICD-10-CM

## 2021-11-15 PROCEDURE — 99214 OFFICE O/P EST MOD 30 MIN: CPT | Performed by: FAMILY MEDICINE

## 2021-11-15 PROCEDURE — 3078F DIAST BP <80 MM HG: CPT | Performed by: FAMILY MEDICINE

## 2021-11-15 PROCEDURE — 3074F SYST BP LT 130 MM HG: CPT | Performed by: FAMILY MEDICINE

## 2021-11-15 RX ORDER — PEAK FLOW METER
EACH MISCELLANEOUS
Qty: 1 EACH | Refills: 0 | Status: SHIPPED | OUTPATIENT
Start: 2021-11-15 | End: 2021-11-29

## 2021-11-15 RX ORDER — CODEINE PHOSPHATE AND GUAIFENESIN 10; 100 MG/5ML; MG/5ML
10 SOLUTION ORAL NIGHTLY PRN
Qty: 50 ML | Refills: 0 | Status: SHIPPED | OUTPATIENT
Start: 2021-11-15 | End: 2021-11-20

## 2021-11-15 RX ORDER — BENZONATATE 100 MG/1
100 CAPSULE ORAL 3 TIMES DAILY PRN
Qty: 15 CAPSULE | Refills: 0 | Status: SHIPPED | OUTPATIENT
Start: 2021-11-15 | End: 2021-11-20

## 2021-11-15 NOTE — PROGRESS NOTES
[facility] Family Medicine Office Note  Chief Complaint:   Patient presents with:  Er F/u: ER on 11/9/21 dx with pneumonia. Patient states still having cough/SOB/night sweats.  No fever      HPI:   This is a 31year old female coming in for  Dx of tobacco: Never Used    Vaping Use      Vaping Use: Never used    Alcohol use: Yes      Comment: occ    Drug use: Not on file    Family History:  Family History   Problem Relation Age of Onset   • Other (Sjogren) Mother    • Other (Lymphatic caner) Father answering appropriately   SKIN: No pallor, no erythema, no cyanosis, warm and dry  Eyes: wnl, normal conjunctiva   HEAD: Normocephalic, atraumatic  EENT: OP - wnl, moist, no anterior neck fullness noted at this time, Nares normal  NECK: FROM, supple  LUNGS Oral Cap 15 capsule 0     Sig: Take 1 capsule (100 mg total) by mouth 3 (three) times daily as needed for cough. • guaiFENesin-codeine (CHERATUSSIN AC) 100-10 MG/5ML Oral Solution 50 mL 0     Sig: Take 10 mL by mouth nightly as needed for cough.        He

## 2021-11-17 ENCOUNTER — TELEPHONE (OUTPATIENT)
Dept: FAMILY MEDICINE CLINIC | Facility: CLINIC | Age: 36
End: 2021-11-17

## 2021-11-17 NOTE — TELEPHONE ENCOUNTER
PT called to request a return to work note. Pt would like to return to work today, 11/17/21  Pt states she has been off due to pneumonia and covid. Pt said she is feeling much better since seeing Dr Lisbet Castrejon on Monday.      Pt is requesting a note be sent v

## 2021-11-17 NOTE — TELEPHONE ENCOUNTER
[facility] sent to pt regarding doctor's  Routing to nurse pool for follow-up message read by pt and for pt response    Placed - Notify me if not read by: 11/20/2021

## 2021-11-17 NOTE — TELEPHONE ENCOUNTER
[facility] sent to pt regarding doctor's note below  Routing to nurse pool for follow-up message read by pt and for pt response    Placed - Notify me if not read by: 11/20/21

## 2021-11-17 NOTE — TELEPHONE ENCOUNTER
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  I was on short term disability starting form my positive covid test and my job have me 2 months of leave and today I w

## 2021-11-17 NOTE — TELEPHONE ENCOUNTER
Well the pneumonia threw a wrench into this, so she needs to inform her work of the same.  If she needs a letter to extend this we can send this in. ~ MM

## 2021-11-17 NOTE — TELEPHONE ENCOUNTER
Pt states needs a return to work letter from [address] [place] [address] Drive even if its with restrictions     Pt call back # [phone]    Thank you

## 2021-11-17 NOTE — TELEPHONE ENCOUNTER
Please let patient know that I would prefer her to recover at home, and would not want her to go back to work right away. Coughing spells and breathing should be back to normal. Also what kind of work does she do ?

## 2021-11-29 ENCOUNTER — OFFICE VISIT (OUTPATIENT)
Dept: FAMILY MEDICINE CLINIC | Facility: CLINIC | Age: 36
End: 2021-11-29

## 2021-11-29 ENCOUNTER — HOSPITAL ENCOUNTER (OUTPATIENT)
Dept: GENERAL RADIOLOGY | Age: 36
Discharge: HOME | End: 2021-11-29
Attending: FAMILY MEDICINE
Payer: COMMERCIAL

## 2021-11-29 ENCOUNTER — HOSPITAL ENCOUNTER (INPATIENT)
Facility: HOSPITAL | Age: 36
LOS: 2 days | Discharge: HOME | DRG: 194 | End: 2021-12-01
Attending: EMERGENCY MEDICINE | Admitting: INTERNAL MEDICINE
Payer: COMMERCIAL

## 2021-11-29 VITALS
RESPIRATION RATE: 20 BRPM | OXYGEN SATURATION: 98 % | HEART RATE: 102 BPM | BODY MASS INDEX: 18 KG/M2 | SYSTOLIC BLOOD PRESSURE: 120 MMHG | TEMPERATURE: 99 F | DIASTOLIC BLOOD PRESSURE: 72 MMHG | WEIGHT: 109.5 LBS

## 2021-11-29 DIAGNOSIS — J18.9 COMMUNITY ACQUIRED PNEUMONIA OF RIGHT MIDDLE LOBE OF LUNG: Primary | ICD-10-CM

## 2021-11-29 DIAGNOSIS — R05.8 SPASMODIC COUGH: ICD-10-CM

## 2021-11-29 DIAGNOSIS — D86.9 SARCOIDOSIS: ICD-10-CM

## 2021-11-29 DIAGNOSIS — R07.1 PAIN OF ANTERIOR CHEST WALL WITH RESPIRATION: ICD-10-CM

## 2021-11-29 DIAGNOSIS — J18.9 COMMUNITY ACQUIRED PNEUMONIA, UNSPECIFIED LATERALITY: Primary | ICD-10-CM

## 2021-11-29 PROCEDURE — 99214 OFFICE O/P EST MOD 30 MIN: CPT | Performed by: FAMILY MEDICINE

## 2021-11-29 PROCEDURE — 3078F DIAST BP <80 MM HG: CPT | Performed by: FAMILY MEDICINE

## 2021-11-29 PROCEDURE — 3074F SYST BP LT 130 MM HG: CPT | Performed by: FAMILY MEDICINE

## 2021-11-29 PROCEDURE — 71046 X-RAY EXAM CHEST 2 VIEWS: CPT | Performed by: FAMILY MEDICINE

## 2021-11-29 PROCEDURE — 99223 1ST HOSP IP/OBS HIGH 75: CPT | Performed by: INTERNAL MEDICINE

## 2021-11-29 RX ORDER — PREDNISONE 20 MG/1
40 TABLET ORAL
Status: DISCONTINUED | OUTPATIENT
Start: 2021-11-30 | End: 2021-12-01

## 2021-11-29 RX ORDER — MELATONIN
3 NIGHTLY PRN
Status: DISCONTINUED | OUTPATIENT
Start: 2021-11-29 | End: 2021-12-01

## 2021-11-29 RX ORDER — ALBUTEROL SULFATE 90 UG/1
2 AEROSOL, METERED RESPIRATORY (INHALATION) EVERY 4 HOURS PRN
Status: DISCONTINUED | OUTPATIENT
Start: 2021-11-29 | End: 2021-12-01

## 2021-11-29 RX ORDER — PROCHLORPERAZINE EDISYLATE 5 MG/ML
5 INJECTION INTRAMUSCULAR; INTRAVENOUS EVERY 8 HOURS PRN
Status: DISCONTINUED | OUTPATIENT
Start: 2021-11-29 | End: 2021-12-01

## 2021-11-29 RX ORDER — ENOXAPARIN SODIUM 100 MG/ML
40 INJECTION SUBCUTANEOUS DAILY
Status: DISCONTINUED | OUTPATIENT
Start: 2021-11-29 | End: 2021-12-01

## 2021-11-29 RX ORDER — METHYLPREDNISOLONE SODIUM SUCCINATE 125 MG/2ML
125 INJECTION, POWDER, LYOPHILIZED, FOR SOLUTION INTRAMUSCULAR; INTRAVENOUS ONCE
Status: COMPLETED | OUTPATIENT
Start: 2021-11-29 | End: 2021-11-29

## 2021-11-29 RX ORDER — BENZONATATE 100 MG/1
100 CAPSULE ORAL 3 TIMES DAILY PRN
Status: DISCONTINUED | OUTPATIENT
Start: 2021-11-29 | End: 2021-12-01

## 2021-11-29 RX ORDER — ACETAMINOPHEN 325 MG/1
650 TABLET ORAL EVERY 6 HOURS PRN
Status: DISCONTINUED | OUTPATIENT
Start: 2021-11-29 | End: 2021-12-01

## 2021-11-29 RX ORDER — SODIUM CHLORIDE 9 MG/ML
INJECTION, SOLUTION INTRAVENOUS CONTINUOUS
Status: ACTIVE | OUTPATIENT
Start: 2021-11-29 | End: 2021-11-29

## 2021-11-29 RX ORDER — ONDANSETRON 2 MG/ML
4 INJECTION INTRAMUSCULAR; INTRAVENOUS EVERY 6 HOURS PRN
Status: DISCONTINUED | OUTPATIENT
Start: 2021-11-29 | End: 2021-12-01

## 2021-11-29 RX ORDER — ONDANSETRON 2 MG/ML
4 INJECTION INTRAMUSCULAR; INTRAVENOUS EVERY 4 HOURS PRN
Status: DISCONTINUED | OUTPATIENT
Start: 2021-11-29 | End: 2021-11-29

## 2021-11-29 RX ORDER — FLUTICASONE PROPIONATE 50 MCG
1 SPRAY, SUSPENSION (ML) NASAL DAILY
Status: DISCONTINUED | OUTPATIENT
Start: 2021-11-29 | End: 2021-12-01

## 2021-11-29 NOTE — PROGRESS NOTES
[address] [facility] Family Medicine Office Note  Chief Complaint:   Patient presents with: Follow - Up: Patient states she was getting better but over the weekend sx worsened. SOB/cough/tired.       HPI:   This is a 31year old female with hx of sarcoidos SPIROMETER) Does not apply Device Use this to help with breathing exercises and taking deep breaths 1 each 0   • predniSONE 20 MG Oral Tab Take 40 mg by mouth daily.      • Albuterol Sulfate  (90 Base) MCG/ACT Inhalation Aero Soln Inhale 2 puffs into silhouette is mildly prominent. Dictated by (CST): Sherri Farah MD on 11/09/2021 at 6:47 AM     Finalized by (CST): Sherri Farah MD on 11/09/2021 at 6:49 AM       2. Sarcoidosis  As noted above     3.  Community acquired pneumonia, unspecifie

## 2021-11-29 NOTE — ED INITIAL ASSESSMENT (HPI)
PT diagnosed with pneumonia 2 weeks ago, prescribed antibiotics which were completed. PT seen by PCP with chest Xray completed which showed worsening pneumonia. PT reports low grade fevers, and some vomiting.  Denies diarrhea

## 2021-11-30 PROCEDURE — 99232 SBSQ HOSP IP/OBS MODERATE 35: CPT | Performed by: HOSPITALIST

## 2021-11-30 RX ORDER — CODEINE PHOSPHATE AND GUAIFENESIN 10; 100 MG/5ML; MG/5ML
5 SOLUTION ORAL EVERY 4 HOURS PRN
Status: DISCONTINUED | OUTPATIENT
Start: 2021-11-30 | End: 2021-12-01

## 2021-11-30 RX ORDER — MULTIPLE VITAMINS W/ MINERALS TAB 9MG-400MCG
1 TAB ORAL DAILY
Status: DISCONTINUED | OUTPATIENT
Start: 2021-11-30 | End: 2021-12-01

## 2021-11-30 NOTE — ED QUICK NOTES
Orders for admission, patient is aware of plan and ready to go upstairs. Any questions, please call ED RN veronica  at extension [phone]. Vaccinated?  yes  Type of COVID test sent: rapid  COVID Suspicion level: Low      Titratable drug(s) infusing: none, ab

## 2021-11-30 NOTE — DIETARY NOTE
Stephan admitted on 11/29 presents with cough, SOB. PMH:  has a past medical history of Extrinsic asthma, unspecified, Sarcoidosis, and Seasonal allergies.      Admitting diagnosis:  Community acquired pneu

## 2021-11-30 NOTE — PROGRESS NOTES
[facility]     Hospitalist Progress Note     Basim Campbell Patient Status:  Inpatient    1985 MRN [id number]   [facility] 5NW-A Attending Burgess Delfino MD   Hosp Day # 1 PCP Kisha Perez MD     Chief Complaint: n Oral Daily   • fluticasone furoate-vilanterol  1 puff Inhalation Daily   • enoxaparin  40 mg Subcutaneous Daily   • cefTRIAXone  1 g Intravenous Q24H   • azithromycin  500 mg Intravenous Q24H   • fluticasone propionate  1 spray Each Nare Daily   • predniSO

## 2021-11-30 NOTE — PLAN OF CARE
Pt arrived to unit in stable condition. VSS, afebrile on RA. Denies pain. Positive rhino/adeno virus, droplet precautions in place. AOX4, VSS, afebrile. RA . Positive cough, prn given. Tele NSR, lovenox. IND in room.  IV ABX PNA, failed outpt abx ther support as indicated  - Manage/alleviate anxiety  - Monitor for signs/symptoms of CO2 retention  Outcome: Progressing     Problem: PAIN - ADULT  Goal: Verbalizes/displays adequate comfort level or patient's stated pain goal  Description: INTERVENTIONS:  -

## 2021-11-30 NOTE — CM/SW NOTE
11/30/21 1109   CM/SW Referral Data   Referral Source Physician   Reason for Referral Protocol order set   Specify order set Pneumonia   Informant EMR;Clinical Staff Member   Pertinent Medical Hx   Does patient have an established PCP?  Yes   Significant

## 2021-11-30 NOTE — CONSULTS
[facility]  Report of Consultation    Veronique Roa Patient Status:  Inpatient    1985 MRN [id number]   [facility] 5NW-A Attending Joanna Mckeon MD   Hosp Day # 1 PCP Kisha Moran MD     Reason for Consultation reports previous drug use.   Now back to work    Allergies:  No Known Allergies    Medications:  [] Respiratory Therapy Supplies (ONE FLOW SPIROMETER) Does not apply Device, Use this to help with breathing exercises and taking deep breaths, Disp: 1 e and reactive   Throat: Lips, mucosa, and tongue normal.  No thrush noted. On limited exam   Neck: trachea midline, no adenopathy, no thyromegaly. No JVD.   90 degrees   Lungs: Mild expiratory rhonchi right greater than left slight crackles right base   Jerrica infection      Assessment:  · Rhinovirus/adenovirus infection-doubt bacterial infection-recent community-acquired pneumonia  · Flare of asthma in response to the above  · Diagnosis sarcoidosis with significant bronchiectatic and nodular lung disease--uncle

## 2021-11-30 NOTE — ED PROVIDER NOTES
Patient Seen in: [facility] Emergency Department      History   Patient presents with:  Difficulty Breathing    Stated Complaint: DX WITH PNEUMONIA 2 WEEKS AGO, NO IMPROVEMENT    Subjective:   HPI    28-year-old female with past medical history of s appearance. HENT:      Head: Normocephalic and atraumatic. Nose: Nose normal.      Mouth/Throat:      Mouth: Mucous membranes are moist.   Eyes:      Extraocular Movements: Extraocular movements intact.       Pupils: Pupils are equal, round, and reac W/ DIFFERENTIAL[[id number]]          Abnormal            Final result                 Please view results for these tests on the individual orders.    BLOOD CULTURE   BLOOD CULTURE   RESPIRATORY FLU EXPAND PANEL + COVID-19     EKG    Rate, intervals and axes Nader Bradley MD on 11/09/2021 at 6:49 AM              MDM      17-year-old female presents today for evaluation of persistent cough, shortness of breath and pleuritic chest discomfort.   Outpatient chest x-ray revealed no improvement of her right-side

## 2021-11-30 NOTE — PLAN OF CARE
Patient Aox4, afebrile. On RA, strong nonproductive cough, see MAR. Tele, NSR, Lovenox, electrolyte protocol. No c/o pain, N/V/D. S/B assist. IV, SL. Regular diet, tolerating well. Patient call light demonstrated, within reach.  Will continue to round on pa

## 2021-12-01 VITALS
HEART RATE: 87 BPM | DIASTOLIC BLOOD PRESSURE: 64 MMHG | HEIGHT: 65 IN | OXYGEN SATURATION: 99 % | BODY MASS INDEX: 18.3 KG/M2 | WEIGHT: 109.81 LBS | TEMPERATURE: 98 F | SYSTOLIC BLOOD PRESSURE: 109 MMHG | RESPIRATION RATE: 16 BRPM

## 2021-12-01 PROCEDURE — 99239 HOSP IP/OBS DSCHRG MGMT >30: CPT | Performed by: HOSPITALIST

## 2021-12-01 RX ORDER — PREDNISONE 10 MG/1
TABLET ORAL
Qty: 90 TABLET | Refills: 0 | Status: SHIPPED | OUTPATIENT
Start: 2021-12-01

## 2021-12-01 RX ORDER — AZITHROMYCIN 250 MG/1
250 TABLET, FILM COATED ORAL DAILY
Qty: 4 TABLET | Refills: 0 | Status: SHIPPED | OUTPATIENT
Start: 2021-12-01 | End: 2022-01-12 | Stop reason: ALTCHOICE

## 2021-12-01 RX ORDER — CODEINE PHOSPHATE AND GUAIFENESIN 10; 100 MG/5ML; MG/5ML
5 SOLUTION ORAL EVERY 4 HOURS PRN
Qty: 240 ML | Refills: 1 | Status: SHIPPED | OUTPATIENT
Start: 2021-12-01 | End: 2021-12-01

## 2021-12-01 RX ORDER — CODEINE PHOSPHATE AND GUAIFENESIN 10; 100 MG/5ML; MG/5ML
5 SOLUTION ORAL EVERY 4 HOURS PRN
Qty: 240 ML | Refills: 1 | Status: SHIPPED | OUTPATIENT
Start: 2021-12-01

## 2021-12-01 NOTE — PLAN OF CARE
AOX4, VSS, afebrile. RA . Positive cough, prn given. Reported Nausea no vomiting, prn given with relief. Tele NSR, lovenox. IND in room. IV ABX PNA, failed outpt abx therapy. Updated with POC, no acute events overnight.  will follow.      Mobility Goal: indicated  - Manage/alleviate anxiety  - Monitor for signs/symptoms of CO2 retention  Outcome: Progressing     Problem: PAIN - ADULT  Goal: Verbalizes/displays adequate comfort level or patient's stated pain goal  Description: INTERVENTIONS:  - Encourage p

## 2021-12-01 NOTE — PROGRESS NOTES
NURSING DISCHARGE NOTE    Discharged Home via Wheelchair. Accompanied by Family member  Belongings Taken by patient/family     Pt was discharged via wheelchair. Given paperwork and prescriptions called in. IV removed, tele removed.

## 2021-12-01 NOTE — PROGRESS NOTES
[facility]  Progress Note    Anne-Marie Randle Patient Status:  Inpatient    1985 MRN [id number]   [facility] 5NW-A Attending Sergei Robby  [address] Day # 2 PCP Kisha Quinn MD     Subjective:  Anne-Marie Randle negative    Radiology:  No results found.   Chest x-rays reviewed      Medications reviewed     Assessment and Plan:   Patient Active Problem List:     Sarcoidosis     Family history of autoimmune disorder     Asymmetrical thyroid     Cough variant asthma

## 2021-12-01 NOTE — PLAN OF CARE
Pt is aox4, VSS, afebrile. On RA. Strong, non productive cough. See MAR. Tele NSR. Electrolyte protocol, lovenox. Up self to void. Oral steroids. IV saline locked. Regular diet tolerating well. Resting in bed with call light in place.  Anticipate discharge

## 2021-12-02 ENCOUNTER — PATIENT OUTREACH (OUTPATIENT)
Dept: CASE MANAGEMENT | Age: 36
End: 2021-12-02

## 2021-12-02 ENCOUNTER — TELEPHONE (OUTPATIENT)
Dept: FAMILY MEDICINE CLINIC | Facility: CLINIC | Age: 36
End: 2021-12-02

## 2021-12-02 DIAGNOSIS — Z02.9 ENCOUNTERS FOR ADMINISTRATIVE PURPOSE: ICD-10-CM

## 2021-12-02 NOTE — TELEPHONE ENCOUNTER
Spoke to pt for TCM today. NCM attempted to schedule TCM but pt states that she can not come in any earlier than 3PM. No available appts. .  TCM/HFU appt recommended by 12/8/2021 as pt is a high risk for readmission.   Please discuss with PCP and follow up CAD (coronary artery disease)

## 2021-12-02 NOTE — PROGRESS NOTES
1st attempt at scheduling       MD Gregorio Rico [phone] [phone]    Unable to contact     Left voice mail

## 2021-12-02 NOTE — TELEPHONE ENCOUNTER
Discussed with Dr. Marely Reis regarding note below - please squeeze in on Monday 12/06/2021 at 4PM    Left message to voicemail (per verbal release form consent, confirmed with identifying message.) Patient advised to call office back [phone] - please o

## 2021-12-02 NOTE — PROGRESS NOTES
Initial Post Discharge Follow Up   Discharge Date: 12/1/21  Contact Date: 12/2/2021    Consent Verification:  Assessment Completed With: Patient  HIPAA Verified?   Yes     Discharge Dx:  Community acquired pneumonia of right middle lobe of lung      Was T changes noted on AVS?  yes  o If so, were these medication changes discussed with you prior to leaving the hospital? yes  • (NCM) If a new medication was prescribed:    o Was the new medication’s purpose & side effects reviewed?  yes  o Do you have any ques medications with patient,  and orders reviewed and discussed. Any changes or updates to medications and or orders sent to PCP.

## 2021-12-03 NOTE — TELEPHONE ENCOUNTER
Left detailed message to voicemail (per verbal release form consent with confirmed identifying message) of Doctor's note below.    Patient advised to call office back to confirm appt 12/06/2021 at 4PM - need to schedule

## 2021-12-03 NOTE — PROGRESS NOTES
2ND attempt at scheduling       MD Gregorio Blankenship [address] [phone]    Spoke with patient advised she will call [facility] office today to schedule no help needed

## 2021-12-06 ENCOUNTER — TELEPHONE (OUTPATIENT)
Dept: FAMILY MEDICINE CLINIC | Facility: CLINIC | Age: 36
End: 2021-12-06

## 2021-12-06 NOTE — TELEPHONE ENCOUNTER
Pt reports she was admitted to hospital after office visit with Dr. Gay Hoang on 11/26/2021 - discharged 12/1/2021    Pt c/o Chest pain - when coughing and deep breathing  Was feeling better over weekend  Then today - started this am - feeling more fatigued

## 2021-12-06 NOTE — TELEPHONE ENCOUNTER
Patient advised of Doctor's note below. Patient verbalized understanding - stated she has appt w/ Pulm end of month, but will call to schedule ASAP. No further questions at this time.

## 2021-12-06 NOTE — TELEPHONE ENCOUNTER
Pt called she was seen last week at the ER then was admitted to the hospital. She sates that she is feeling short of breath again, having chest pain, and the fatigue

## 2021-12-06 NOTE — DISCHARGE SUMMARY
[facility] HOSPITALIST  DISCHARGE SUMMARY     Huma Snyder Patient Status:  Inpatient    1985 MRN [id number]   [facility] 5NW-A Attending No att. providers found   [facility] Day # 2 PCP Kisha Dougherty MD     Date of Admission: 6 • NOne    Incidental or significant findings and recommendations (brief descriptions):  • None    Lab/Test results pending at Discharge:   · None    Consultants:  • Pulmonology-Dr. Kahlil Stone    Discharge Medication List:     Discharge Medications      [place] [address]    Phone: [phone]   · azithromycin 250 MG Tabs  · guaiFENesin-codeine 100-10 MG/5ML Soln  · predniSONE 10 MG Tabs         [place]: [place]    Follow-up appointment:   Christie Bravo MD  [address] [place] [address]

## 2021-12-06 NOTE — TELEPHONE ENCOUNTER
Currently on Prednisone, Albuterol inhaler and has now completed antibiotics. Deep breathing exercises to continue. Appears that it is the same pain as she presented with initially.  Now that she has been covered with antibiotics and was recently in the hos

## 2021-12-09 DIAGNOSIS — D86.9 SARCOIDOSIS: Primary | ICD-10-CM

## 2021-12-20 ENCOUNTER — HOSPITAL ENCOUNTER (OUTPATIENT)
Dept: CT IMAGING | Facility: HOSPITAL | Age: 36
Discharge: HOME | End: 2021-12-20
Attending: INTERNAL MEDICINE
Payer: COMMERCIAL

## 2021-12-20 DIAGNOSIS — D86.9 SARCOIDOSIS: ICD-10-CM

## 2021-12-20 PROCEDURE — 71250 CT THORAX DX C-: CPT | Performed by: INTERNAL MEDICINE

## 2021-12-21 ENCOUNTER — TELEPHONE (OUTPATIENT)
Dept: FAMILY MEDICINE CLINIC | Facility: CLINIC | Age: 36
End: 2021-12-21

## 2021-12-21 ENCOUNTER — LAB ENCOUNTER (OUTPATIENT)
Dept: LAB | Facility: HOSPITAL | Age: 36
End: 2021-12-21
Attending: HOSPITALIST
Payer: COMMERCIAL

## 2021-12-21 DIAGNOSIS — D86.9 SARCOIDOSIS: ICD-10-CM

## 2021-12-21 DIAGNOSIS — M32.9 LUPUS (HCC): Primary | ICD-10-CM

## 2021-12-21 DIAGNOSIS — D86.9 SARCOIDOSIS: Primary | ICD-10-CM

## 2021-12-21 PROCEDURE — 86480 TB TEST CELL IMMUN MEASURE: CPT

## 2021-12-21 PROCEDURE — 87385 HISTOPLASMA CAPSUL AG IA: CPT

## 2021-12-21 PROCEDURE — 86698 HISTOPLASMA ANTIBODY: CPT

## 2021-12-21 PROCEDURE — 86641 CRYPTOCOCCUS ANTIBODY: CPT

## 2021-12-21 PROCEDURE — 87305 ASPERGILLUS AG IA: CPT

## 2021-12-21 PROCEDURE — 86612 BLASTOMYCES ANTIBODY: CPT

## 2021-12-21 PROCEDURE — 86635 COCCIDIOIDES ANTIBODY: CPT

## 2021-12-21 PROCEDURE — 87449 NOS EACH ORGANISM AG IA: CPT

## 2021-12-21 PROCEDURE — 36415 COLL VENOUS BLD VENIPUNCTURE: CPT

## 2021-12-21 PROCEDURE — 86606 ASPERGILLUS ANTIBODY: CPT

## 2021-12-21 NOTE — TELEPHONE ENCOUNTER
Routing to covering provider    From what I can see in Epic it looks like she saw Dr. Tricia Patel and they are referring her to Dr. Ana M Goddard    Please sign referral if everything is correct

## 2021-12-21 NOTE — TELEPHONE ENCOUNTER
The patient states that she needs referral to see a pulmonologist at [facility]  Dr. Jose A Escamilla  Ph. [phone]    Saw pulmonologist today and is being referred to Dr. Ras Larios.

## 2021-12-22 NOTE — TELEPHONE ENCOUNTER
[facility] sent to pt regarding referral info  Routing to nurse pool for follow-up message read by pt

## 2021-12-30 ENCOUNTER — LAB ENCOUNTER (OUTPATIENT)
Dept: LAB | Age: 36
End: 2021-12-30
Attending: HOSPITALIST
Payer: COMMERCIAL

## 2021-12-30 DIAGNOSIS — D86.9 SARCOIDOSIS: Primary | ICD-10-CM

## 2021-12-30 PROCEDURE — 87305 ASPERGILLUS AG IA: CPT

## 2021-12-30 PROCEDURE — 36415 COLL VENOUS BLD VENIPUNCTURE: CPT

## 2022-01-01 LAB
ASPERGILLUS GALACTOMANNAN AG: NEGATIVE
ASPERGILLUS GALACTOMANNAN INDX: 0.03

## 2022-01-04 ENCOUNTER — TELEPHONE (OUTPATIENT)
Dept: FAMILY MEDICINE CLINIC | Facility: CLINIC | Age: 37
End: 2022-01-04

## 2022-01-04 DIAGNOSIS — D86.9 SARCOIDOSIS: Primary | ICD-10-CM

## 2022-01-04 NOTE — TELEPHONE ENCOUNTER
Patient needs a referral to see a specialist out of [facility].     Dr. Mensah Draft  Office # [phone]

## 2022-01-04 NOTE — TELEPHONE ENCOUNTER
Insurance has to be updated since this is a new year, once that has been completed then we can place referral.     Pt was notified.  She states Dr. Shyla Arndt is a pulmonologist.

## 2022-01-04 NOTE — TELEPHONE ENCOUNTER
Need notes from Dr. Dinora Freedman office. Office was closed when I called. They will reopen tomorrow at 9am. Please call for office notes. [phone]. After notes are received, please place referral to see if referral is approved.  Pt has sarcoidosis and was ad

## 2022-01-05 NOTE — TELEPHONE ENCOUNTER
Called Dr. Valverde [facility] office [phone]    Will send office visit notes - our fax # provided    Awaiting fax at this time

## 2022-01-06 NOTE — TELEPHONE ENCOUNTER
This doctor is not in network- If Dr. Veronica Jimenez recommended that the pt see Dr. Prema Strickland then we need the clinicals that the in network physician recommended this doc- My concern is that it is a [facility] doc and they are not tiertiary. [facility] and [facility] are [employer].

## 2022-01-06 NOTE — TELEPHONE ENCOUNTER
Received fax from [phone] regarding pt's office visit on 01/04/2022    External Pulm Referral for Dr. Adelia Salmeron, pending - please review.  Thank you

## 2022-01-07 NOTE — TELEPHONE ENCOUNTER
Discussed with Nursing manager - please inform pt that she ultimately needs to make the decision    Her insurance will NOT cover [facility] doctor - pt would have to pay out of pocket    Her insurance will cover Chivo Jiménez, and [facility]    Please inform pt if she

## 2022-01-07 NOTE — TELEPHONE ENCOUNTER
Kisha Lozada Nurse  Caller: Unspecified (Today,  1:18 PM)  Pt called she would like a call back today regard her referral.Thanks

## 2022-01-07 NOTE — TELEPHONE ENCOUNTER
Called and spoke with pt - she reports that she was originally going to see Dr. Taye Mccain of [address] [place] [address] - however would not be able to see her until mid March    Pt was advised that Dr. Baldev Roach of [facility] - would be able to see pt sooner, possibly next week    Advise

## 2022-01-07 NOTE — TELEPHONE ENCOUNTER
Called Dr. Kayla Guzman office [phone]  Was able to schedule pt with Dr. Marcelina Pearson on 1/17/2022 at [address] am for PFT testing, then appt with doctor at 1000    Patient advised of note above. Patient verbalized understanding.  Pt inquiring if Dr. Marcelina Pearson sp

## 2022-01-15 ENCOUNTER — LAB ENCOUNTER (OUTPATIENT)
Dept: LAB | Facility: HOSPITAL | Age: 37
End: 2022-01-15
Attending: INTERNAL MEDICINE
Payer: COMMERCIAL

## 2022-01-15 DIAGNOSIS — R91.8 PULMONARY INFILTRATES: ICD-10-CM

## 2022-01-16 LAB — SARS-COV-2 RNA RESP QL NAA+PROBE: DETECTED

## 2022-01-31 ENCOUNTER — ANESTHESIA EVENT (OUTPATIENT)
Dept: ENDOSCOPY | Facility: HOSPITAL | Age: 37
End: 2022-01-31
Payer: COMMERCIAL

## 2022-02-02 ENCOUNTER — APPOINTMENT (OUTPATIENT)
Dept: GENERAL RADIOLOGY | Facility: HOSPITAL | Age: 37
End: 2022-02-02
Attending: INTERNAL MEDICINE
Payer: COMMERCIAL

## 2022-02-02 ENCOUNTER — HOSPITAL ENCOUNTER (OUTPATIENT)
Facility: HOSPITAL | Age: 37
Setting detail: HOSPITAL OUTPATIENT SURGERY
Discharge: HOME | End: 2022-02-02
Attending: INTERNAL MEDICINE | Admitting: INTERNAL MEDICINE
Payer: COMMERCIAL

## 2022-02-02 ENCOUNTER — ANESTHESIA (OUTPATIENT)
Dept: ENDOSCOPY | Facility: HOSPITAL | Age: 37
End: 2022-02-02
Payer: COMMERCIAL

## 2022-02-02 VITALS
SYSTOLIC BLOOD PRESSURE: 144 MMHG | BODY MASS INDEX: 18.16 KG/M2 | WEIGHT: 109 LBS | RESPIRATION RATE: 16 BRPM | DIASTOLIC BLOOD PRESSURE: 90 MMHG | OXYGEN SATURATION: 95 % | HEIGHT: 65 IN | HEART RATE: 99 BPM | TEMPERATURE: 99 F

## 2022-02-02 LAB
B-HCG UR QL: NEGATIVE
BASOPHILS NFR BRONCH: 0 %
EOSINOPHIL NFR BRONCH: 3 %
LYMPHOCYTES NFR BRONCH: 23 %
MONOS+MACROS NFR BRONCH: 19 %
NEUTROPHILS NFR BRONCH: 55 %
RBC # FLD: 59 /MM3
RBC BRONCH FOR MAN CT: 5000 /MM3
TOTAL CELLS COUNTED FLD: 100

## 2022-02-02 PROCEDURE — 87116 MYCOBACTERIA CULTURE: CPT | Performed by: INTERNAL MEDICINE

## 2022-02-02 PROCEDURE — 89050 BODY FLUID CELL COUNT: CPT | Performed by: INTERNAL MEDICINE

## 2022-02-02 PROCEDURE — 87071 CULTURE AEROBIC QUANT OTHER: CPT | Performed by: INTERNAL MEDICINE

## 2022-02-02 PROCEDURE — 87305 ASPERGILLUS AG IA: CPT | Performed by: INTERNAL MEDICINE

## 2022-02-02 PROCEDURE — 87206 SMEAR FLUORESCENT/ACID STAI: CPT | Performed by: INTERNAL MEDICINE

## 2022-02-02 PROCEDURE — 0B9C8ZX DRAINAGE OF RIGHT UPPER LUNG LOBE, VIA NATURAL OR ARTIFICIAL OPENING ENDOSCOPIC, DIAGNOSTIC: ICD-10-PCS | Performed by: INTERNAL MEDICINE

## 2022-02-02 PROCEDURE — 87556 M.TUBERCULO DNA AMP PROBE: CPT | Performed by: INTERNAL MEDICINE

## 2022-02-02 PROCEDURE — 87798 DETECT AGENT NOS DNA AMP: CPT | Performed by: INTERNAL MEDICINE

## 2022-02-02 PROCEDURE — 87205 SMEAR GRAM STAIN: CPT | Performed by: INTERNAL MEDICINE

## 2022-02-02 PROCEDURE — 81025 URINE PREGNANCY TEST: CPT

## 2022-02-02 PROCEDURE — 71045 X-RAY EXAM CHEST 1 VIEW: CPT | Performed by: INTERNAL MEDICINE

## 2022-02-02 PROCEDURE — 87102 FUNGUS ISOLATION CULTURE: CPT | Performed by: INTERNAL MEDICINE

## 2022-02-02 PROCEDURE — 0BDC8ZX EXTRACTION OF RIGHT UPPER LUNG LOBE, VIA NATURAL OR ARTIFICIAL OPENING ENDOSCOPIC, DIAGNOSTIC: ICD-10-PCS | Performed by: INTERNAL MEDICINE

## 2022-02-02 PROCEDURE — 89051 BODY FLUID CELL COUNT: CPT | Performed by: INTERNAL MEDICINE

## 2022-02-02 RX ORDER — NALOXONE HYDROCHLORIDE 0.4 MG/ML
80 INJECTION, SOLUTION INTRAMUSCULAR; INTRAVENOUS; SUBCUTANEOUS AS NEEDED
Status: DISCONTINUED | OUTPATIENT
Start: 2022-02-02 | End: 2022-02-02

## 2022-02-02 RX ORDER — SODIUM CHLORIDE, SODIUM LACTATE, POTASSIUM CHLORIDE, CALCIUM CHLORIDE 600; 310; 30; 20 MG/100ML; MG/100ML; MG/100ML; MG/100ML
INJECTION, SOLUTION INTRAVENOUS CONTINUOUS
Status: DISCONTINUED | OUTPATIENT
Start: 2022-02-02 | End: 2022-02-02

## 2022-02-02 RX ORDER — KETAMINE HYDROCHLORIDE 50 MG/ML
INJECTION, SOLUTION, CONCENTRATE INTRAMUSCULAR; INTRAVENOUS AS NEEDED
Status: DISCONTINUED | OUTPATIENT
Start: 2022-02-02 | End: 2022-02-02 | Stop reason: SURG

## 2022-02-02 RX ORDER — LIDOCAINE HYDROCHLORIDE 20 MG/ML
INJECTION, SOLUTION EPIDURAL; INFILTRATION; INTRACAUDAL; PERINEURAL
Status: DISCONTINUED | OUTPATIENT
Start: 2022-02-02 | End: 2022-02-02

## 2022-02-02 RX ADMIN — KETAMINE HYDROCHLORIDE 25 MG: 50 INJECTION, SOLUTION, CONCENTRATE INTRAMUSCULAR; INTRAVENOUS at 13:41:00

## 2022-02-02 RX ADMIN — SODIUM CHLORIDE, SODIUM LACTATE, POTASSIUM CHLORIDE, CALCIUM CHLORIDE: 600; 310; 30; 20 INJECTION, SOLUTION INTRAVENOUS at 14:13:00

## 2022-02-02 NOTE — ANESTHESIA POSTPROCEDURE EVALUATION
[facility] Patient Status:  Hospital Outpatient Surgery   Age/Gender 31year old female MRN [id number]   Location [address] [address] Attending Nahum Roberts MD   [facility] Day # 0 PCP Kisha Allen MD       Anesthesia Post-op Note    BRONCHOSCOPY WITH BRONCHOALVEOLA LAVAGE, BRUSHING,  FLUOROSCOPY    Procedure Summary     Date: 02/02/22 Room / Location: [facility] ENDOSCOPY 04 / [facility] ENDOSCOPY    Anesthesia Start: 2350 Anesthesia Stop: 9496    Procedure: BRONCHOSCOPY WITH Selestine Foots, BRUSHING,  FLUOROSCOPY (N/A ) Diagnosis: (PULMONARY INFILTRATES SARCOID)    Surgeons: Nahum Roberts MD Anesthesiologist: Adriano Celis MD    Anesthesia Type: MAC ASA Status: 2          Anesthesia Type: MAC    Vitals Value Taken Time   BP Pt coughing 02/02/22 1413   Temp n 02/02/22 1413   Pulse 122 02/02/22 1413   Resp 20 02/02/22 1413   SpO2 100 02/02/22 1413       Patient Location: Endoscopy    Anesthesia Type: MAC    Airway Patency: patent    Postop Pain Control: adequate    Mental Status: mildly sedated but able to meaningfully participate in the post-anesthesia evaluation    Nausea/Vomiting: none    Cardiopulmonary/Hydration status: stable euvolemic    Complications: no apparent anesthesia related complications    Postop vital signs: stable    Patient to be discharged home when criteria met.

## 2022-02-03 LAB — M TB CMPLX RRNA SPEC QL PROBE: NOT DETECTED

## 2022-02-04 LAB
ASPERGILLUS GALACTOMANNAN AG, BAL: NEGATIVE
ASPERGILLUS GALACTOMANNAN INDX: 0.06

## 2022-02-07 ENCOUNTER — TELEPHONE (OUTPATIENT)
Dept: FAMILY MEDICINE CLINIC | Facility: CLINIC | Age: 37
End: 2022-02-07

## 2022-02-07 NOTE — TELEPHONE ENCOUNTER
Pt called states will be traveling out of the country and would like a letter from [address] [place] [address] stating she is okay to travel       Pt call back # [phone]    Thank you

## 2022-02-07 NOTE — TELEPHONE ENCOUNTER
Pt called back and was scheduled   Future Appointments   Date Time Provider Jorge Luis Gonzales   2/9/2022  9:20 AM Stefanie Alvarado MD [facility] Carmin Opitz

## 2022-02-07 NOTE — TELEPHONE ENCOUNTER
LOV with Dr. Rose Meth 11/29/2021    Pt requesting letter from provider - okay to travel? Does pt need to schedule office visit/video visit?     Please advise, thank you

## 2022-02-07 NOTE — TELEPHONE ENCOUNTER
Video visit is ok. Please schedule sometime this week. If she prefers to come in-person that's fine as well.

## 2022-02-10 ENCOUNTER — TELEMEDICINE (OUTPATIENT)
Dept: FAMILY MEDICINE CLINIC | Facility: CLINIC | Age: 37
End: 2022-02-10

## 2022-02-10 DIAGNOSIS — R06.02 SOB (SHORTNESS OF BREATH): ICD-10-CM

## 2022-02-10 DIAGNOSIS — Z86.16 PERSONAL HISTORY OF COVID-19: ICD-10-CM

## 2022-02-10 DIAGNOSIS — Z02.79 MEDICAL CERTIFICATE ISSUANCE: Primary | ICD-10-CM

## 2022-02-10 DIAGNOSIS — D86.9 SARCOIDOSIS: ICD-10-CM

## 2022-02-10 DIAGNOSIS — R05.8 SPASMODIC COUGH: ICD-10-CM

## 2022-02-10 PROCEDURE — 99213 OFFICE O/P EST LOW 20 MIN: CPT | Performed by: FAMILY MEDICINE

## 2022-02-21 ENCOUNTER — TELEPHONE (OUTPATIENT)
Dept: FAMILY MEDICINE CLINIC | Facility: CLINIC | Age: 37
End: 2022-02-21

## 2022-02-21 NOTE — TELEPHONE ENCOUNTER
Received fax from [employer] regarding pt's Long term disability (LTD) benefits     Requesting \"Restrictions Form\" to be completed    Dr. Albarran to review    Need to fax back to #[phone]

## 2022-02-22 NOTE — TELEPHONE ENCOUNTER
I will need to see her in the office to fill out the form / restrictions. What she can and cannot do will be based on my in-office assessment. Please let her know that I would like to see her next week.

## 2022-02-22 NOTE — TELEPHONE ENCOUNTER
Jignesh Och notified of below listed below via detailed message (consent on file). Please schedule patient for next week. Paperwork in [employer]. Thank you.

## 2022-03-03 ENCOUNTER — TELEPHONE (OUTPATIENT)
Dept: FAMILY MEDICINE CLINIC | Facility: CLINIC | Age: 37
End: 2022-03-03

## 2022-03-03 NOTE — TELEPHONE ENCOUNTER
from [facility] called pt is needing to have a referral placed to see     Dr. Sam Xiong -pulmonologist  [facility]-[id number]  [address] [place], [place] [id number]  Phone : [phone]  Fax:[phone]    Guilherme said its for a new Pt appointment and diagnosis that was nosed was ILD.       If any question please call back [phone] option 4

## 2022-03-03 NOTE — TELEPHONE ENCOUNTER
Referral Placed and routed to [address] [place] [address] at [facility] to ensure Jourdan Chapman is covered    No future appointments.

## 2022-03-11 ENCOUNTER — TELEPHONE (OUTPATIENT)
Dept: FAMILY MEDICINE CLINIC | Facility: CLINIC | Age: 37
End: 2022-03-11

## 2022-03-11 NOTE — TELEPHONE ENCOUNTER
Received fax from [employer] regarding pt's Long Term Disability Benefits    \"Restrictions Form\"    Please refer to TE 02/21/2022  LOV/Telemedicine 02/10/2022    Left detailed message to voicemail (per verbal release form consent with confirmed identifying message) of note above.  Patient advised to call office back - need to schedule in office appt with Dr. Rose Garcia to complete LTD forms

## 2022-03-31 ENCOUNTER — TELEPHONE (OUTPATIENT)
Dept: FAMILY MEDICINE CLINIC | Facility: CLINIC | Age: 37
End: 2022-03-31

## 2022-03-31 NOTE — TELEPHONE ENCOUNTER
PT CALLED AND ADV HAD HISTORY OF SARCOIDOSIS AND HAS NOTICE IN THE LAST MONTH MORE BLURRED VISION AT TIME, AND SOME SPOTS. LOOKING FOR RECOMMENDATIONS OR REFERRAL TO OPTHALMOLOGIST.     PLEASE ADV    THANK YOU

## 2022-03-31 NOTE — TELEPHONE ENCOUNTER
Discussed with Dr. Weaver Re regarding note below - does pt see ophthalmologist? Should behaving yearly eye exams - if not, please place referral      Josselin Alfaro 36 [phone]     Patient advised of Doctor's note above. Patient verbalized understanding. Pt says its been a long time since she has seen an ophthalmologist. Pt agreeable to receive referral info via [facility]. No further questions at this time.

## 2022-04-06 ENCOUNTER — TELEPHONE (OUTPATIENT)
Dept: FAMILY MEDICINE CLINIC | Facility: CLINIC | Age: 37
End: 2022-04-06

## 2022-04-06 NOTE — TELEPHONE ENCOUNTER
Patient advised of supervisor's note below.  Patient verbalized understanding and stated she will call her insurance tomorrow

## 2022-04-06 NOTE — TELEPHONE ENCOUNTER
Pt trying to figure out [facility] - referred to [facility] - Dr. Ashley Dixon    Pt still struggling doing simple things - had setbacks covid, Pneumonia - Is on Long term prednisone    More bad days than good; Goal to have more better days than bad    As a child - saw Rheum - for sarcoidosis  Never saw Pulm - every so often had PFT test - was active child/young adult  Pt did not have lung issues until pt had babies    Pt stated she Called Dr. Jalen Escobar office to ask if she needed to bring anything prior to office visit and was advised they have what they need  Saw Dr. Ashley Dixon 03/08/2022 - for about 10 mins - pt was taken aback because she was advised that he can't help without information    Pt stated she Does not feel seen    Pt has nurse friend at [facility] - pt inquiring if there is a Pulmonologist in network with her HMO she can see at [facility]? Spouse - figuring out insurance HMO vs PPO - better option? Can pt see Pulmonologist at [facility]? If not, what the out of network difference would be?     Please advise, thank you

## 2022-04-06 NOTE — TELEPHONE ENCOUNTER
PT CALLED AND WANTED TO KNOW IF THERE WAS ANY WAY OF GETTING A REFERRAL TO DR Walker. WAS REFERRER TO FROM DR Nathaniel Goetz. HAS SOUR TASTE ABOUT SEEING OTHER DR OUT OF [facility].     PLEASE ADV    THANK YOU

## 2022-04-06 NOTE — TELEPHONE ENCOUNTER
We can try to get a referral to see [facility]- for another opinion, place the referral as external make sure we have the clinical notes- submit all the information to insurance and we can see what insurance says.     She will need to check to see if she has out of network coverage with her insurance to see what her cost will be- if she doesn't have out of network coverage then she will be responsible for the charges, but we can try to get the insurance to authorize the referral to [facility]- please be very detailed in the request

## 2022-04-07 NOTE — TELEPHONE ENCOUNTER
Referral order placed    CPT [id number]    [facility] sent to pt regarding referral order placed

## 2022-06-10 ENCOUNTER — TELEPHONE (OUTPATIENT)
Dept: FAMILY MEDICINE CLINIC | Facility: CLINIC | Age: 37
End: 2022-06-10

## 2022-06-10 NOTE — TELEPHONE ENCOUNTER
Left detailed message to voicemail (per verbal release form consent with confirmed identifying message) regarding note below.  Patient was advised to call office back - need to clarify if this referral ([facility] - Dr. Chalino Mann) still needed or if seeing Dr. Adriel Steele only (this referral is authorized)

## 2022-06-10 NOTE — TELEPHONE ENCOUNTER
----- Message from Parker Berrios RN sent at 6/10/2022 12:12 PM CDT -----  Regarding: referral  There was a referral placed for this pt on 4/7/22 and it was closed as [facility] is trying to wrap up some ongoing issues with referrals    Ref# [id number] to Dr. Georgia Subramanian    Please review and if it is still needed please replace this referral    Thanks  Virginia Canavan

## 2023-01-19 ENCOUNTER — HOSPITAL ENCOUNTER (OUTPATIENT)
Age: 38
Discharge: HOME | End: 2023-01-19
Payer: COMMERCIAL

## 2023-01-19 VITALS
SYSTOLIC BLOOD PRESSURE: 118 MMHG | RESPIRATION RATE: 18 BRPM | OXYGEN SATURATION: 94 % | HEART RATE: 57 BPM | TEMPERATURE: 98 F | DIASTOLIC BLOOD PRESSURE: 80 MMHG

## 2023-01-19 DIAGNOSIS — B34.9 VIRAL SYNDROME: ICD-10-CM

## 2023-01-19 DIAGNOSIS — R05.9 COUGH: Primary | ICD-10-CM

## 2023-01-19 LAB
POCT INFLUENZA A: NEGATIVE
POCT INFLUENZA B: NEGATIVE
S PYO AG THROAT QL: NEGATIVE

## 2023-01-19 PROCEDURE — 87880 STREP A ASSAY W/OPTIC: CPT | Performed by: NURSE PRACTITIONER

## 2023-01-19 PROCEDURE — 99213 OFFICE O/P EST LOW 20 MIN: CPT | Performed by: NURSE PRACTITIONER

## 2023-01-19 PROCEDURE — 87502 INFLUENZA DNA AMP PROBE: CPT | Performed by: NURSE PRACTITIONER

## 2023-01-19 RX ORDER — FOLIC ACID 1 MG/1
TABLET ORAL DAILY
COMMUNITY

## 2023-01-19 RX ORDER — BUDESONIDE AND FORMOTEROL FUMARATE DIHYDRATE 160; 4.5 UG/1; UG/1
AEROSOL RESPIRATORY (INHALATION) 2 TIMES DAILY
COMMUNITY

## 2023-01-19 NOTE — DISCHARGE INSTRUCTIONS
Follow-up with your primary care physician in one week if symptoms have not improved or symptoms are starting to get worse. Increase fluids, keep well-hydrated. Take Tylenol and Motrin for fever and pain. Take over-the-counter antihistamine such as Zyrtec Allegra Claritin or Xyzal take nightly  Gargle with warm salt water  Return to the emergency room if your symptoms or concerns.

## 2023-02-10 NOTE — H&P
[facility]  History and Physical     Lianna Hill Patient Status:  Inpatient    1985 MRN [id number]   [facility] 5NW-A Attending Patrick Salazar MD   Hosp Day # 0 PCP Kisha Ponce MD     Chief Complaint: SOB Patient is due for apt   Disp: , Rfl:   Albuterol Sulfate  (90 Base) MCG/ACT Inhalation Aero Soln, Inhale 2 puffs into the lungs every 4 (four) hours as needed. , Disp: , Rfl:   fluticasone-salmeterol 500-50 MCG/DOSE Inhalation Aerosol Powder, Breath Activated, Inhale 1 puff Not Detected 11/29/2021    COVID19 Not Detected 11/29/2021    COVID19 Not Detected 11/09/2021       Pro-Calcitonin  Recent Labs   Lab 11/29/21  1822   PCT <0.05       Cardiac  Recent Labs   Lab 11/29/21 1822   TROP <0.045       Creatinine Kinase  No resul

## 2024-04-12 ENCOUNTER — PATIENT OUTREACH (OUTPATIENT)
Dept: CASE MANAGEMENT | Age: 39
End: 2024-04-12

## (undated) DIAGNOSIS — D86.9 SARCOIDOSIS: ICD-10-CM

## (undated) DIAGNOSIS — Z86.2 HISTORY OF SARCOIDOSIS: ICD-10-CM

## (undated) DIAGNOSIS — J84.9 ILD (INTERSTITIAL LUNG DISEASE) (HCC): ICD-10-CM

## (undated) DIAGNOSIS — Z86.16 PERSONAL HISTORY OF COVID-19: ICD-10-CM

## (undated) DIAGNOSIS — Z86.2 HISTORY OF SARCOIDOSIS: Primary | ICD-10-CM

## (undated) DIAGNOSIS — J84.9 ILD (INTERSTITIAL LUNG DISEASE) (HCC): Primary | ICD-10-CM

## (undated) DIAGNOSIS — H53.8 BLURRY VISION: Primary | ICD-10-CM

## (undated) DIAGNOSIS — R06.02 SOB (SHORTNESS OF BREATH): ICD-10-CM

## (undated) DEVICE — MEDI-VAC NON-CONDUCTIVE SUCTION TUBING: Brand: CARDINAL HEALTH

## (undated) DEVICE — AIRLIFE™ MISTY MAX 10™ NEBULIZER WITH 7 FOOT (2.1 M) FEMALE U/CONNECT-IT CRUSH RESISTANT OXYGEN TUBING, BAFFLED TEE ADAPTER (22 MM I.D./ 22 MM O.D.), MOUTHPIECE AND 6 INCH (15 CM) FLEXTUBE: Brand: AIRLIFE™

## (undated) DEVICE — FLUIDGARD® 160 ANTI-FOG SURGICAL MASK WITH ANTI-GLARE SHIELD: Brand: PRECEPT ®

## (undated) DEVICE — 1200CC GUARDIAN II: Brand: GUARDIAN

## (undated) DEVICE — SINGLE USE BIOPSY VALVE MAJ-210: Brand: SINGLE USE BIOPSY VALVE (STERILE)

## (undated) DEVICE — SPECIMEN TRAP LUKI

## (undated) DEVICE — CONMED DISPOSABLE MICROBIOLOGY BRUSH, Ø1 MM, 1.8 MM WORKING DIAMETER, 110 CM LENGTH: Brand: CONMED

## (undated) DEVICE — MASK ETCO2 PANORAMIC

## (undated) DEVICE — SINGLE USE SUCTION VALVE MAJ-209: Brand: SINGLE USE SUCTION VALVE (STERILE)

## (undated) DEVICE — 3M™ RED DOT™ MONITORING ELECTRODE WITH FOAM TAPE AND STICKY GEL, 50/BAG, 20/CASE, 72/PLT 2570: Brand: RED DOT™

## (undated) DEVICE — SYRINGE 10ML SLIP TIP

## (undated) DEVICE — MEDI-VAC SUCTION HANDLE REGULAR CAPACITY: Brand: CARDINAL HEALTH

## (undated) DEVICE — BOWLS UTILITY 16OZ

## (undated) DEVICE — ENDOSCOPY PACK UPPER: Brand: MEDLINE INDUSTRIES, INC.

## (undated) DEVICE — 60 ML SYRINGE REGULAR TIP: Brand: MONOJECT

## (undated) NOTE — LETTER
Date: 8/6/2021    Patient Name: Santiago Adair          To Whom it may concern: This letter has been written at the patient's request. The above patient was seen at the [facility] for treatment of a medical condition.     This patient was

## (undated) NOTE — LETTER
Date: 2/10/2022    Patient Name: Christy Conway      To Whom it may concern: This letter has been written at the patient's request. The above patient was assessed at the [facility] and is clear to travel at this time. Recent history of COVID 1/15/2022 and has now recovered.    Has also received her COVID vaccinations on 5/17/2021 and 6/19/2021     This letter serves as information, and should be used with discretion      Sincerely,          Mydhili Claudetta Newton, MD

## (undated) NOTE — LETTER
:  1985    To Whom It May Concern: This patient was seen in our office on 11/15/21, and is being treated for a medical condition. At this time she may return to work, provided she is working from home, and has no strenuous physical activity.  Kadie Hinds

## (undated) NOTE — LETTER
Date: 8/5/2021    Patient Name: Bryan Matias          To Whom it may concern: This letter has been written at the patient's request. The above patient was seen at the [facility] for treatment of a medical condition.     This patient shou

## (undated) NOTE — LETTER
:  1985      To Whom It May Concern: This patient was seen in our office on 21 . Work status:  {ANDI RETURN TO JXDU:2231}    May return to work status per above effective ***.     If this office may be of further assistance, please do not